# Patient Record
Sex: MALE | Race: BLACK OR AFRICAN AMERICAN | NOT HISPANIC OR LATINO | Employment: OTHER | URBAN - METROPOLITAN AREA
[De-identification: names, ages, dates, MRNs, and addresses within clinical notes are randomized per-mention and may not be internally consistent; named-entity substitution may affect disease eponyms.]

---

## 2023-03-08 LAB
ANION GAP IN SER/PLAS: 16 MMOL/L (ref 10–20)
CALCIUM (MG/DL) IN SER/PLAS: 9.2 MG/DL (ref 8.6–10.6)
CARBON DIOXIDE, TOTAL (MMOL/L) IN SER/PLAS: 23 MMOL/L (ref 21–32)
CHLORIDE (MMOL/L) IN SER/PLAS: 108 MMOL/L (ref 98–107)
CREATININE (MG/DL) IN SER/PLAS: 1.64 MG/DL (ref 0.5–1.3)
GFR MALE: 42 ML/MIN/1.73M2
GLUCOSE (MG/DL) IN SER/PLAS: 102 MG/DL (ref 74–99)
POTASSIUM (MMOL/L) IN SER/PLAS: 4.7 MMOL/L (ref 3.5–5.3)
SODIUM (MMOL/L) IN SER/PLAS: 142 MMOL/L (ref 136–145)
UREA NITROGEN (MG/DL) IN SER/PLAS: 23 MG/DL (ref 6–23)

## 2023-11-09 ENCOUNTER — LAB (OUTPATIENT)
Dept: LAB | Facility: LAB | Age: 78
End: 2023-11-09
Payer: MEDICAID

## 2023-11-09 ENCOUNTER — OFFICE VISIT (OUTPATIENT)
Dept: CARDIOLOGY | Facility: HOSPITAL | Age: 78
End: 2023-11-09
Payer: MEDICAID

## 2023-11-09 DIAGNOSIS — I50.20 HFREF (HEART FAILURE WITH REDUCED EJECTION FRACTION) (MULTI): Primary | ICD-10-CM

## 2023-11-09 DIAGNOSIS — I50.20 UNSPECIFIED SYSTOLIC (CONGESTIVE) HEART FAILURE (MULTI): Primary | ICD-10-CM

## 2023-11-09 DIAGNOSIS — I48.92 ATRIAL FLUTTER, UNSPECIFIED TYPE (MULTI): ICD-10-CM

## 2023-11-09 PROBLEM — Z86.718 HISTORY OF DVT (DEEP VEIN THROMBOSIS): Status: ACTIVE | Noted: 2023-11-09

## 2023-11-09 PROBLEM — H52.202 ASTIGMATISM OF LEFT EYE: Status: ACTIVE | Noted: 2023-11-09

## 2023-11-09 PROBLEM — M19.90 OSTEOARTHROSIS: Status: ACTIVE | Noted: 2023-11-09

## 2023-11-09 PROBLEM — H02.88B MEIBOMIAN GLAND DYSFUNCTION (MGD) OF UPPER AND LOWER EYELID OF LEFT EYE: Status: ACTIVE | Noted: 2023-11-09

## 2023-11-09 PROBLEM — H52.13 MYOPIA WITH PRESBYOPIA OF BOTH EYES: Status: ACTIVE | Noted: 2023-11-09

## 2023-11-09 PROBLEM — M54.9 BACK PAIN, CHRONIC: Status: ACTIVE | Noted: 2023-11-09

## 2023-11-09 PROBLEM — G89.29 BACK PAIN, CHRONIC: Status: ACTIVE | Noted: 2023-11-09

## 2023-11-09 PROBLEM — I25.10 ATHEROSCLEROTIC HEART DISEASE OF NATIVE CORONARY ARTERY WITHOUT ANGINA PECTORIS: Status: ACTIVE | Noted: 2023-11-09

## 2023-11-09 PROBLEM — Z95.810 CARDIAC DEFIBRILLATOR IN SITU: Status: ACTIVE | Noted: 2023-11-09

## 2023-11-09 PROBLEM — H52.4 MYOPIA WITH PRESBYOPIA OF BOTH EYES: Status: ACTIVE | Noted: 2023-11-09

## 2023-11-09 PROBLEM — I77.810 AORTIC ROOT DILATION (CMS-HCC): Status: ACTIVE | Noted: 2023-11-09

## 2023-11-09 PROBLEM — J44.9 COPD, MODERATE (MULTI): Status: ACTIVE | Noted: 2023-11-09

## 2023-11-09 PROBLEM — H02.88A MEIBOMIAN GLAND DYSFUNCTION (MGD) OF UPPER AND LOWER EYELID OF RIGHT EYE: Status: ACTIVE | Noted: 2023-11-09

## 2023-11-09 PROBLEM — E55.9 VITAMIN D DEFICIENCY: Status: ACTIVE | Noted: 2023-11-09

## 2023-11-09 PROBLEM — H52.03 HYPERMETROPIA OF BOTH EYES: Status: ACTIVE | Noted: 2023-11-09

## 2023-11-09 PROBLEM — H25.13 CATARACT, NUCLEAR SCLEROTIC, BOTH EYES: Status: ACTIVE | Noted: 2023-11-09

## 2023-11-09 PROBLEM — F12.10 MARIJUANA ABUSE: Status: ACTIVE | Noted: 2023-11-09

## 2023-11-09 PROBLEM — R91.1 LUNG NODULE: Status: ACTIVE | Noted: 2023-11-09

## 2023-11-09 LAB
ALBUMIN SERPL BCP-MCNC: 4.1 G/DL (ref 3.4–5)
ANION GAP SERPL CALC-SCNC: 14 MMOL/L (ref 10–20)
BUN SERPL-MCNC: 28 MG/DL (ref 6–23)
CALCIUM SERPL-MCNC: 9.3 MG/DL (ref 8.6–10.6)
CHLORIDE SERPL-SCNC: 108 MMOL/L (ref 98–107)
CO2 SERPL-SCNC: 23 MMOL/L (ref 21–32)
CREAT SERPL-MCNC: 1.62 MG/DL (ref 0.5–1.3)
GFR SERPL CREATININE-BSD FRML MDRD: 43 ML/MIN/1.73M*2
GLUCOSE SERPL-MCNC: 92 MG/DL (ref 74–99)
PHOSPHATE SERPL-MCNC: 3.5 MG/DL (ref 2.5–4.9)
POTASSIUM SERPL-SCNC: 4.6 MMOL/L (ref 3.5–5.3)
SODIUM SERPL-SCNC: 140 MMOL/L (ref 136–145)

## 2023-11-09 PROCEDURE — 99215 OFFICE O/P EST HI 40 MIN: CPT | Mod: 25 | Performed by: INTERNAL MEDICINE

## 2023-11-09 PROCEDURE — 36415 COLL VENOUS BLD VENIPUNCTURE: CPT

## 2023-11-09 PROCEDURE — 80069 RENAL FUNCTION PANEL: CPT

## 2023-11-09 PROCEDURE — 1126F AMNT PAIN NOTED NONE PRSNT: CPT | Performed by: INTERNAL MEDICINE

## 2023-11-09 PROCEDURE — 99215 OFFICE O/P EST HI 40 MIN: CPT | Performed by: INTERNAL MEDICINE

## 2023-11-09 PROCEDURE — 1159F MED LIST DOCD IN RCRD: CPT | Performed by: INTERNAL MEDICINE

## 2023-11-09 PROCEDURE — 93005 ELECTROCARDIOGRAM TRACING: CPT | Performed by: INTERNAL MEDICINE

## 2023-11-09 PROCEDURE — 93010 ELECTROCARDIOGRAM REPORT: CPT | Performed by: INTERNAL MEDICINE

## 2023-11-09 PROCEDURE — 1160F RVW MEDS BY RX/DR IN RCRD: CPT | Performed by: INTERNAL MEDICINE

## 2023-11-09 RX ORDER — TIOTROPIUM BROMIDE INHALATION SPRAY 3.12 UG/1
2 SPRAY, METERED RESPIRATORY (INHALATION) DAILY
COMMUNITY
Start: 2020-11-02 | End: 2024-01-24 | Stop reason: SDUPTHER

## 2023-11-09 RX ORDER — NAPROXEN SODIUM 220 MG/1
1 TABLET, FILM COATED ORAL DAILY
COMMUNITY
Start: 2017-08-22 | End: 2024-01-24 | Stop reason: SDUPTHER

## 2023-11-09 RX ORDER — LOSARTAN POTASSIUM 25 MG/1
1 TABLET ORAL DAILY
COMMUNITY
Start: 2022-05-19 | End: 2023-11-09 | Stop reason: DRUGHIGH

## 2023-11-09 RX ORDER — LOSARTAN POTASSIUM 50 MG/1
1 TABLET ORAL DAILY
COMMUNITY
End: 2024-01-24 | Stop reason: SDUPTHER

## 2023-11-09 RX ORDER — CARVEDILOL 6.25 MG/1
1 TABLET ORAL
COMMUNITY
Start: 2022-01-18 | End: 2024-01-24 | Stop reason: SDUPTHER

## 2023-11-09 RX ORDER — ACETAMINOPHEN 500 MG
500 TABLET ORAL
COMMUNITY
Start: 2023-09-26 | End: 2024-05-20 | Stop reason: SDUPTHER

## 2023-11-09 RX ORDER — ATORVASTATIN CALCIUM 40 MG/1
1 TABLET, FILM COATED ORAL NIGHTLY
COMMUNITY
Start: 2018-02-20 | End: 2024-01-24 | Stop reason: SDUPTHER

## 2023-11-09 RX ORDER — ALBUTEROL SULFATE 90 UG/1
2 AEROSOL, METERED RESPIRATORY (INHALATION) 4 TIMES DAILY
COMMUNITY
Start: 2017-12-14 | End: 2024-01-24 | Stop reason: SDUPTHER

## 2023-11-09 RX ORDER — TAMSULOSIN HYDROCHLORIDE 0.4 MG/1
0.4 CAPSULE ORAL
COMMUNITY
Start: 2023-09-18 | End: 2024-01-24 | Stop reason: SDUPTHER

## 2023-11-09 ASSESSMENT — PAIN SCALES - GENERAL: PAINLEVEL: 0-NO PAIN

## 2023-11-09 NOTE — PROGRESS NOTES
"[facility]  Primary Care Physician: No Assigned PCP Generic Provider, MD  Referring Provider: Bishnu      Date of Visit: 11/09/2023  3:00 PM EST  Location of visit: [facility]     HPI:   Mr. Ogden is an 78M with a PMHx sig for stage C systolic HF/NICM/HFrEF with severe LV dysfunction currently without an ICD, HTN, nonobstructive CAD, ascending aortic dilatation, h/o RLE DVT (setting of COVID; 12/2021), BPH, and CKD who presents to the Advanced Heart Failure clinic to establish care.       Currently denies chest pain, palpitations, or LE edema. He reports dyspnea on exertion. Denies orthopnea, PND. No edema noted in BLE. Patient denies headaches, dizziness or recent falls.      Hospitalizations: Denies  Medication adherence:  Diet adherence: Low sodium  Exercise: Physical therapy    SocHx:   Denies smoking, ETOH, illicits, lives alone, has sister and daughter in the area    FamHx:   Mother had CAD      Current Outpatient Medications   Medication Sig Dispense Refill    acetaminophen (Tylenol) 500 mg tablet Take 1 tablet (500 mg) by mouth.      aspirin 81 mg chewable tablet Chew 1 tablet (81 mg) once daily.      atorvastatin (Lipitor) 40 mg tablet Take 1 tablet (40 mg) by mouth once daily at bedtime.      carvedilol (Coreg) 6.25 mg tablet Take 1 tablet (6.25 mg) by mouth 2 times a day with meals.      losartan (Cozaar) 50 mg tablet Take 1 tablet (50 mg) by mouth once daily.      Spiriva Respimat 2.5 mcg/actuation inhaler Inhale 2 puffs once daily.      tamsulosin (Flomax) 0.4 mg 24 hr capsule Take 1 capsule (0.4 mg) by mouth.      Ventolin HFA 90 mcg/actuation inhaler Inhale 2 puffs 4 times a day.       No current facility-administered medications for this visit.       Allergies   Allergen Reactions    Penicillins Anaphylaxis         Visit Vitals  /81 (BP Location: Left arm, Patient Position: Sitting, BP Cuff Size: Adult long)   Pulse 95   Ht 1.778 m (5' 10\") "   Wt 100 kg (221 lb)   SpO2 95%   BMI 31.71 kg/m²   Smoking Status Every Day   BSA 2.22 m²        Physical Exam:  On exam Mr. Ogden appears his stated age, is alert and oriented x3, and in no acute distress. His sclera are anicteric and his oropharynx has moist mucous membranes. His neck is supple and without thyromegaly. The JVP is ~6 cm of water above the right atrium. His cardiac exam has regular rhythm, normal S1, S2. No S3/4. There are no murmurs. His lungs are clear to auscultation bilaterally and there is no dullness to percussion. His abdomen is soft, nontender with normoactive bowel sounds. There is no HJR. The extremities are warm and without edema. The skin is dry. There is no rash present. The distal pulses are 2+ in all four extremities. His mood and affect are appropriate for todays encounter.       Cardiac Labs/Diagnostics:    Lab Results   Component Value Date    CREATININE 1.64 (H) 03/08/2023    BUN 23 03/08/2023     03/08/2023    K 4.7 03/08/2023     (H) 03/08/2023    CO2 23 03/08/2023        Recent Labs     12/20/21  0246 12/17/21  0646 12/16/21  1824 08/03/21  1416 10/05/20  2105   * 61 68 154* 3,040*       ECG (11/9/23):  A-sense, V-paced ()    CT chest (3/8/23):  1.  There is stable mild thoracic aneurysmal dilatation of the aortic  root and ascending aorta. Recommend surveillance with 2 year  echocardiogram or contrasted chest CT.  2. Extensive parenchymal changes suggest prior COVID 19 infection  3. Fluid-filled esophagus and correlate with esophageal  dysmotility/reflux.    Echo (3/8/23):  1. Left ventricular systolic function is severely decreased with a 25-30% estimated ejection fraction.  2. Spectral Doppler shows an impaired relaxation pattern of left ventricular diastolic filling.  3. The left atrium is mild to moderately dilated.  4. There is global hypokinesis of the left ventricle with minor regional variations.  5. There is moderate dilatation of the  aortic root.  6. There is moderate dilatation of the ascending aorta.    Cardiac cath (10/14/20):  1. Right dominant system.  2. Mid RCA stenosis of 40-50%.   3. Non-obstructive coronary artery disease.  4. Normal filling pressure with RA pressure of 4 mmHG and PCWP of 13 mmHG.          Impression/Plan:  Mr. Ogden is an 78M with a PMHx sig for stage C systolic HF/NICM/HFrEF with severe LV dysfunction currently without an ICD, HTN, nonobstructive CAD, ascending aortic dilatation, h/o RLE DVT (setting of COVID; 12/2021), BPH, and CKD who presents to the Advanced Heart Failure clinic to establish care. At the current time he has functional class II symptoms and appears euvolemic on exam.     1) Stage C chronic systolic HF/NICM/HFrEF with severe LV dysfunction (LVEF 25-30%; 3/2023) currently without an ICD  Discussed benefits of optimizing GDMT; he is currently not interested in adjusting his GDMT.   -c/w carvedilol 6.25 mg bid, losartan 50 mg daily  -check BNP; pending results will revisit OMT for HFrEF    2) HTN  -as per #1    3) Nonobstructive CAD  Denies angina. Most recent LDL 59 (8/2021).   -c/w ASA 81 mg daily, atorvastatin 40 mg daily, beta blocker    4) Ascending aortic dilatation  Seen by Dr. Goetz; no intervention at the current time. Need to ensure optimal BP control, as also per #1.  -will monitor with echo      F/U: 3 months at /[facility]      ____________________________________________________________  Marek Story DO  Section of Advanced Heart Failure and Cardiac Transplantation  Division of Cardiovascular Medicine  [facility]  [facility]

## 2023-11-09 NOTE — PATIENT INSTRUCTIONS
It was a pleasure seeing you today. Please contact myself or my team with any questions.     To reach Dr. Story' office please call [phone] ([place]).   Fax: [phone]   To schedule an appointment call [phone]     If you have any questions or need cardiac medication refills, please call the Heart Failure office at [phone], option 6. You may also contact the  Heart Failure Nursing team via email at [email or web address] (Please include your name and date of birth).         1) Continue your current medications  2) Labs (RFP/BNP)  3) Follow up in 3 months at /[facility]

## 2023-11-10 VITALS
BODY MASS INDEX: 31.64 KG/M2 | SYSTOLIC BLOOD PRESSURE: 148 MMHG | DIASTOLIC BLOOD PRESSURE: 78 MMHG | HEIGHT: 70 IN | WEIGHT: 221 LBS | HEART RATE: 95 BPM | OXYGEN SATURATION: 95 %

## 2023-11-20 LAB
ATRIAL RATE: 105 BPM
P AXIS: 64 DEGREES
P OFFSET: 187 MS
P ONSET: 125 MS
PR INTERVAL: 142 MS
Q ONSET: 196 MS
QRS COUNT: 17 BEATS
QRS DURATION: 142 MS
QT INTERVAL: 400 MS
QTC CALCULATION(BAZETT): 528 MS
QTC FREDERICIA: 482 MS
R AXIS: -86 DEGREES
T AXIS: 85 DEGREES
T OFFSET: 396 MS
VENTRICULAR RATE: 105 BPM

## 2024-01-18 ENCOUNTER — TELEPHONE (OUTPATIENT)
Dept: PRIMARY CARE | Facility: HOSPITAL | Age: 79
End: 2024-01-18
Payer: MEDICAID

## 2024-01-18 NOTE — TELEPHONE ENCOUNTER
Patient called in stating that, he was told you need to call [facility] at [phone] and ask for jovita and he needs it ASAP, please and thank you!

## 2024-01-24 ENCOUNTER — OFFICE VISIT (OUTPATIENT)
Dept: PRIMARY CARE | Facility: HOSPITAL | Age: 79
End: 2024-01-24
Payer: MEDICAID

## 2024-01-24 VITALS
BODY MASS INDEX: 31.64 KG/M2 | WEIGHT: 226 LBS | HEART RATE: 92 BPM | HEIGHT: 71 IN | OXYGEN SATURATION: 92 % | SYSTOLIC BLOOD PRESSURE: 129 MMHG | DIASTOLIC BLOOD PRESSURE: 94 MMHG | TEMPERATURE: 99 F

## 2024-01-24 DIAGNOSIS — I25.10 CORONARY ARTERY DISEASE INVOLVING NATIVE CORONARY ARTERY OF NATIVE HEART WITHOUT ANGINA PECTORIS: ICD-10-CM

## 2024-01-24 DIAGNOSIS — J96.01 ACUTE RESPIRATORY FAILURE WITH HYPOXIA (MULTI): Primary | ICD-10-CM

## 2024-01-24 DIAGNOSIS — I50.23 ACUTE ON CHRONIC SYSTOLIC HEART FAILURE (MULTI): ICD-10-CM

## 2024-01-24 DIAGNOSIS — N40.0 BENIGN PROSTATIC HYPERPLASIA, UNSPECIFIED WHETHER LOWER URINARY TRACT SYMPTOMS PRESENT: ICD-10-CM

## 2024-01-24 LAB
ALBUMIN SERPL BCP-MCNC: 3.9 G/DL (ref 3.4–5)
ANION GAP SERPL CALC-SCNC: 17 MMOL/L (ref 10–20)
BUN SERPL-MCNC: 27 MG/DL (ref 6–23)
CALCIUM SERPL-MCNC: 9.4 MG/DL (ref 8.6–10.6)
CHLORIDE SERPL-SCNC: 107 MMOL/L (ref 98–107)
CO2 SERPL-SCNC: 24 MMOL/L (ref 21–32)
CREAT SERPL-MCNC: 1.64 MG/DL (ref 0.5–1.3)
EGFRCR SERPLBLD CKD-EPI 2021: 42 ML/MIN/1.73M*2
ERYTHROCYTE [DISTWIDTH] IN BLOOD BY AUTOMATED COUNT: 14.3 % (ref 11.5–14.5)
GLUCOSE SERPL-MCNC: 88 MG/DL (ref 74–99)
HCT VFR BLD AUTO: 38.4 % (ref 41–52)
HGB BLD-MCNC: 11.6 G/DL (ref 13.5–17.5)
MAGNESIUM SERPL-MCNC: 1.88 MG/DL (ref 1.6–2.4)
MCH RBC QN AUTO: 29.4 PG (ref 26–34)
MCHC RBC AUTO-ENTMCNC: 30.2 G/DL (ref 32–36)
MCV RBC AUTO: 98 FL (ref 80–100)
NRBC BLD-RTO: 0 /100 WBCS (ref 0–0)
PHOSPHATE SERPL-MCNC: 4.6 MG/DL (ref 2.5–4.9)
PLATELET # BLD AUTO: 149 X10*3/UL (ref 150–450)
POTASSIUM SERPL-SCNC: 4.7 MMOL/L (ref 3.5–5.3)
RBC # BLD AUTO: 3.94 X10*6/UL (ref 4.5–5.9)
SODIUM SERPL-SCNC: 143 MMOL/L (ref 136–145)
WBC # BLD AUTO: 5.6 X10*3/UL (ref 4.4–11.3)

## 2024-01-24 PROCEDURE — 99215 OFFICE O/P EST HI 40 MIN: CPT | Performed by: STUDENT IN AN ORGANIZED HEALTH CARE EDUCATION/TRAINING PROGRAM

## 2024-01-24 PROCEDURE — 83880 ASSAY OF NATRIURETIC PEPTIDE: CPT | Performed by: STUDENT IN AN ORGANIZED HEALTH CARE EDUCATION/TRAINING PROGRAM

## 2024-01-24 PROCEDURE — 1126F AMNT PAIN NOTED NONE PRSNT: CPT | Performed by: STUDENT IN AN ORGANIZED HEALTH CARE EDUCATION/TRAINING PROGRAM

## 2024-01-24 PROCEDURE — 84145 PROCALCITONIN (PCT): CPT | Performed by: STUDENT IN AN ORGANIZED HEALTH CARE EDUCATION/TRAINING PROGRAM

## 2024-01-24 PROCEDURE — 36415 COLL VENOUS BLD VENIPUNCTURE: CPT | Performed by: STUDENT IN AN ORGANIZED HEALTH CARE EDUCATION/TRAINING PROGRAM

## 2024-01-24 PROCEDURE — 85027 COMPLETE CBC AUTOMATED: CPT | Performed by: STUDENT IN AN ORGANIZED HEALTH CARE EDUCATION/TRAINING PROGRAM

## 2024-01-24 PROCEDURE — 83735 ASSAY OF MAGNESIUM: CPT | Performed by: STUDENT IN AN ORGANIZED HEALTH CARE EDUCATION/TRAINING PROGRAM

## 2024-01-24 PROCEDURE — 80069 RENAL FUNCTION PANEL: CPT | Performed by: STUDENT IN AN ORGANIZED HEALTH CARE EDUCATION/TRAINING PROGRAM

## 2024-01-24 PROCEDURE — 99215 OFFICE O/P EST HI 40 MIN: CPT | Mod: GC | Performed by: STUDENT IN AN ORGANIZED HEALTH CARE EDUCATION/TRAINING PROGRAM

## 2024-01-24 PROCEDURE — 1160F RVW MEDS BY RX/DR IN RCRD: CPT | Performed by: STUDENT IN AN ORGANIZED HEALTH CARE EDUCATION/TRAINING PROGRAM

## 2024-01-24 RX ORDER — TAMSULOSIN HYDROCHLORIDE 0.4 MG/1
0.4 CAPSULE ORAL DAILY
Qty: 30 CAPSULE | Refills: 11 | Status: SHIPPED | OUTPATIENT
Start: 2024-01-24 | End: 2024-05-20 | Stop reason: SDUPTHER

## 2024-01-24 RX ORDER — TIOTROPIUM BROMIDE INHALATION SPRAY 3.12 UG/1
2 SPRAY, METERED RESPIRATORY (INHALATION) DAILY
Qty: 1 EACH | Refills: 3 | Status: SHIPPED | OUTPATIENT
Start: 2024-01-24 | End: 2024-05-20 | Stop reason: SDUPTHER

## 2024-01-24 RX ORDER — ALBUTEROL SULFATE 90 UG/1
2 AEROSOL, METERED RESPIRATORY (INHALATION) EVERY 4 HOURS PRN
Qty: 18 G | Refills: 2 | Status: SHIPPED | OUTPATIENT
Start: 2024-01-24 | End: 2024-05-20 | Stop reason: SDUPTHER

## 2024-01-24 RX ORDER — ATORVASTATIN CALCIUM 40 MG/1
40 TABLET, FILM COATED ORAL NIGHTLY
Qty: 30 TABLET | Refills: 3 | Status: SHIPPED | OUTPATIENT
Start: 2024-01-24 | End: 2024-05-20 | Stop reason: SDUPTHER

## 2024-01-24 RX ORDER — TORSEMIDE 20 MG/1
40 TABLET ORAL DAILY
Qty: 30 TABLET | Refills: 3 | Status: SHIPPED | OUTPATIENT
Start: 2024-01-24 | End: 2024-01-24 | Stop reason: SDUPTHER

## 2024-01-24 RX ORDER — LOSARTAN POTASSIUM 50 MG/1
50 TABLET ORAL DAILY
Qty: 30 TABLET | Refills: 11 | Status: SHIPPED | OUTPATIENT
Start: 2024-01-24 | End: 2024-05-20 | Stop reason: SDUPTHER

## 2024-01-24 RX ORDER — TORSEMIDE 20 MG/1
40 TABLET ORAL DAILY
Qty: 30 TABLET | Refills: 3 | Status: SHIPPED | OUTPATIENT
Start: 2024-01-24 | End: 2024-04-15

## 2024-01-24 RX ORDER — NAPROXEN SODIUM 220 MG/1
81 TABLET, FILM COATED ORAL DAILY
Qty: 30 TABLET | Refills: 3 | Status: SHIPPED | OUTPATIENT
Start: 2024-01-24 | End: 2024-05-20 | Stop reason: SDUPTHER

## 2024-01-24 RX ORDER — CARVEDILOL 6.25 MG/1
6.25 TABLET ORAL
Qty: 60 TABLET | Refills: 11 | Status: SHIPPED | OUTPATIENT
Start: 2024-01-24 | End: 2024-05-20 | Stop reason: SDUPTHER

## 2024-01-24 ASSESSMENT — ENCOUNTER SYMPTOMS
LOSS OF SENSATION IN FEET: 0
DEPRESSION: 0
OCCASIONAL FEELINGS OF UNSTEADINESS: 1

## 2024-01-24 ASSESSMENT — PATIENT HEALTH QUESTIONNAIRE - PHQ9
2. FEELING DOWN, DEPRESSED OR HOPELESS: NOT AT ALL
SUM OF ALL RESPONSES TO PHQ9 QUESTIONS 1 & 2: 0
1. LITTLE INTEREST OR PLEASURE IN DOING THINGS: NOT AT ALL

## 2024-01-24 ASSESSMENT — PAIN SCALES - GENERAL: PAINLEVEL: 0-NO PAIN

## 2024-01-24 NOTE — PROGRESS NOTES
CC: F2F for HHA and Home oxygen      HPI: Mr. Ogden is a 78 year old M with PMHx of Stage C systolic HF/NICM/HFrEF (TTE 3/23 EF 25-30%), 2:1 AV block s/p ICD, HTN, nonobstructive CAD, ascending aortic dilatation (4.1 cm on CT 03/2023), h/o RLE DVT (setting of COVID; 12/2021), BPH, and CKD (baseline Scr ~1.5), Aflutter s/p CTI RFA (2018), previous respiratory failure requiring oxygen supplementation (setting of COVID PNA; 12/2022)     Presents to today to renew HHA/home oxygen. During interview patient visibly tachypneic particularly when speaking. Reports that over the past 2.5 months he has been experiencing new SOB. Reports that it initially came on due to use of bug spray and that is what continues to cause despite not reusing the spray. Characterizes as worsening with exertion and relieved by rest, not associated with chest pain, recent fever, or cough. Was previously on oxygen when he had COVID PNA in 2022 however weaned off (until 2.5 months ago). Unclear when he stopped using oxygen. Endorses lower extremity swelling, weight gain, and difficulty laying flat. Compliant with all medications. Has no dietary restrictions. No recent illness. Pt did not bring home oxygen tank to visit today.      Vitals:    01/24/24 1318   BP: (!) 129/94   Pulse: 92   Temp: 37.2 °C (99 °F)   SpO2: 92%   Weight: 226 lb (3 months prior 221, reports baseline weight as 214 lb).     On ambulation, desaturated to 86% and was increasingly SOB. Saturation improved to 94% with 2L NC.     Current Outpatient Medications on File Prior to Visit   Medication Sig Dispense Refill    acetaminophen (Tylenol) 500 mg tablet Take 1 tablet (500 mg) by mouth.      [DISCONTINUED] aspirin 81 mg chewable tablet Chew 1 tablet (81 mg) once daily.      [DISCONTINUED] atorvastatin (Lipitor) 40 mg tablet Take 1 tablet (40 mg) by mouth once daily at bedtime.      [DISCONTINUED] carvedilol (Coreg) 6.25 mg tablet Take 1 tablet (6.25 mg) by mouth 2 times a day  with meals.      [DISCONTINUED] losartan (Cozaar) 50 mg tablet Take 1 tablet (50 mg) by mouth once daily.      [DISCONTINUED] Spiriva Respimat 2.5 mcg/actuation inhaler Inhale 2 puffs once daily.      [DISCONTINUED] tamsulosin (Flomax) 0.4 mg 24 hr capsule Take 1 capsule (0.4 mg) by mouth.      [DISCONTINUED] Ventolin HFA 90 mcg/actuation inhaler Inhale 2 puffs 4 times a day.       No current facility-administered medications on file prior to visit.         Physical Exam   Constitutional - tachypnea   CV- +S1/S2, no mrg   PULM - crackles at bilateral bases otherwise clear  GI- soft, non tender,   Neck - no JVD, carotid bruits   Ext -“ 2+ bilateral extremity swelling to the shins     Assessment/Plan:     Mr. Ogden is a 78 year old M with PMHx of Stage C systolic HF/NICM/HFrEF (TTE 3/23 EF 25-30%), 2:1 AV block s/p ICD, Aflutter s/p CTI RFA (2018)HTN, nonobstructive CAD, ascending aortic dilatation (4.1 cm on CT 03/2023), h/o RLE DVT (setting of COVID; 12/2021), BPH, and CKD (baseline Scr ~1.5), previous respiratory failure requiring oxygen supplementation (setting of COVID PNA; 12/2022) presenting to [facility] for  F2F for HHA and Home oxygen and found to have AHRF.     **Explained to patient that given low oxygen levels with ambulation AND lack of ambulatory home oxygen, my medical recommendation would be to either cordero ambulatory oxygen or go to the emergency department for further evaluation. Patient expressed that he was not interested and prefer to go home and use his own home oxygen    #AHRF  #Stage C systolic HF/NICM/HFrEF (TTE 3/23 EF 25-30%)   #2:1 AV block s/p ICD  #Aflutter s/p CTI RFA (2018)  #HTN  :: Would appear that patient recovered from previous episode of resp failure (setting of COVID) and over the past few months has developed worsening SOB a/w orthopnea weight gain, and lower extremity swelling. Suspect ADHF possibly due to diet.   -Start torsemide 40 mg every day (higher dose to account for  CrCl)  -Instructed on dietary discretion in terms of salt and daily weight check   -Labs and CXR today   -Plan for virtual appointment 1/29   -Refilled home oxygen   -C/w current GDMT regimen - Losartan 50, Coreg 6.125   -Followed by HF (Dr Story), next appt 02/2024    #CAD, nonobstructive   :: Cath 10/2020 w/ mild RCA stenosis 40-50%   -c/w ASA 81 mg daily, atorvastatin 40 mg daily, beta blocker     #Ascending aortic dilatation (4.1 cm on CT 03/2023)  :: Stable on most recent CT. Seen by CTS and recommended no addition follow up  -Repeat screening 03/2025    #BPH  -C/w tamsulosin 0.4     #CKD (baseline Scr ~1.5)  :: Likely in the setting of HTN. HA1C 5.7% 2021.   -Repeat labs today     #COPD  -Chart diagnosis of COPD, however PFTs from 10/2020 w/ no airway obstruction  [ ] C/w albuterol prn, Spiriva 2 puffs daily    #Health Maintenance  Screeening  -cscope â€“ last in 2015 , negative findings. -LDCT/AAA â€“ not discussed in this visit.      Health Maintenance   cscope  last in 2015 , negative findings.   LDCT/AAA not discussed in this visit.   Shingrix not discussed at this visit   Covid  vaccinated x 2   Flu not discussed this visit

## 2024-01-24 NOTE — PATIENT INSTRUCTIONS
Mr Ogden,     Today we discussed your worsening shortness of breath. I am concerned that this is related to your known condition of heart failure. Below is the plan we discussed   Add new medication called torsemide . This will cause you to urinate more frequently. Please take in the AM  Please complete chest xray on the fifth floor  Refrain from food items that are salty and weigh yourself on a daily basis   We will have a virtual or phone appointment on Monday to see how are you feeling. Please repeat blood work Monday morning prior to our appointment.    I refilled the remainder off your medications as well as your home oxygen and HHA.

## 2024-01-25 LAB
BNP SERPL-MCNC: 1521 PG/ML (ref 0–99)
PROCALCITONIN SERPL-MCNC: 0.05 NG/ML

## 2024-01-26 NOTE — PROGRESS NOTES
I saw and evaluated the patient. I personally obtained the key and critical portions of the history and physical exam or was physically present for key and critical portions performed by the resident/fellow. I reviewed the resident/fellow's documentation and discussed the patient with the resident/fellow. I agree with the resident/fellow's medical decision making as documented in the note.    Patient seen in clinic, came in for follow-up.  Was noted to be short of breath, hypoxic 86 to 88% with ambulation, no distress but was noted to be mildly tachypneic.  On rest he was comfortable, was placed on 2 L nasal cannula oxygen in the clinic.  On exam patient was noted to have some crackles, lower extremity edema present.  Does have history of congestive heart failure, stage C last EF of 25 to 30%.  He was advised to go to the ED given his shortness of breath and desaturation, he does have home oxygen but came in with no O2 to the clinic.  He adamantly refused to go to the ER, reported that he could go back home shortly and use his oxygen.  Plan to get labs RFP, BNP and chest x-ray.  Given concern for decompensated heart failure we will start him on torsemide pending labs.  Will need to follow-up with cardiology.      Ioana Winter MD MPH

## 2024-01-30 DIAGNOSIS — I50.20 HFREF (HEART FAILURE WITH REDUCED EJECTION FRACTION) (MULTI): Primary | ICD-10-CM

## 2024-01-30 NOTE — PROGRESS NOTES
Spoke to patient this AM (1/30) regarding shortness of breath following recent appt on 1/24. Reports improvement since starting torsemide. Particularly less SOB with movement and now only using oxygen while he sleeps. Also notes less leg swelling with diminished indentation along his socks. Continues to feel SOB when laying in his bed. Confirmed his appt with cardiology on 2/15. Instructed him to complete blood work prior to this (RFP, Mg, BNP). RTC 6 months.

## 2024-02-15 ENCOUNTER — OFFICE VISIT (OUTPATIENT)
Dept: CARDIOLOGY | Facility: HOSPITAL | Age: 79
End: 2024-02-15
Payer: MEDICAID

## 2024-02-15 ENCOUNTER — LAB (OUTPATIENT)
Dept: LAB | Facility: LAB | Age: 79
End: 2024-02-15
Payer: MEDICAID

## 2024-02-15 VITALS
OXYGEN SATURATION: 96 % | BODY MASS INDEX: 30.13 KG/M2 | HEART RATE: 96 BPM | WEIGHT: 216 LBS | DIASTOLIC BLOOD PRESSURE: 81 MMHG | SYSTOLIC BLOOD PRESSURE: 123 MMHG

## 2024-02-15 DIAGNOSIS — I42.8 NONISCHEMIC CARDIOMYOPATHY (MULTI): ICD-10-CM

## 2024-02-15 DIAGNOSIS — I50.20 ACC/AHA STAGE C SYSTOLIC HEART FAILURE (MULTI): Primary | ICD-10-CM

## 2024-02-15 DIAGNOSIS — I50.20 HFREF (HEART FAILURE WITH REDUCED EJECTION FRACTION) (MULTI): ICD-10-CM

## 2024-02-15 DIAGNOSIS — I10 ESSENTIAL HYPERTENSION: ICD-10-CM

## 2024-02-15 LAB — BNP SERPL-MCNC: 199 PG/ML (ref 0–99)

## 2024-02-15 PROCEDURE — 1160F RVW MEDS BY RX/DR IN RCRD: CPT | Performed by: INTERNAL MEDICINE

## 2024-02-15 PROCEDURE — 3079F DIAST BP 80-89 MM HG: CPT | Performed by: INTERNAL MEDICINE

## 2024-02-15 PROCEDURE — 83880 ASSAY OF NATRIURETIC PEPTIDE: CPT

## 2024-02-15 PROCEDURE — 1126F AMNT PAIN NOTED NONE PRSNT: CPT | Performed by: INTERNAL MEDICINE

## 2024-02-15 PROCEDURE — 3074F SYST BP LT 130 MM HG: CPT | Performed by: INTERNAL MEDICINE

## 2024-02-15 PROCEDURE — 36415 COLL VENOUS BLD VENIPUNCTURE: CPT

## 2024-02-15 PROCEDURE — 1159F MED LIST DOCD IN RCRD: CPT | Performed by: INTERNAL MEDICINE

## 2024-02-15 PROCEDURE — 99214 OFFICE O/P EST MOD 30 MIN: CPT | Performed by: INTERNAL MEDICINE

## 2024-02-15 ASSESSMENT — PAIN SCALES - GENERAL: PAINLEVEL: 0-NO PAIN

## 2024-02-15 NOTE — PROGRESS NOTES
[facility] Advanced [facility]  Primary Care Physician: Marvel Pinon MD  Referring Provider: Bishnu      Date of Visit: 02/15/2024  2:40 PM EST  Location of visit: [facility]     HPI:   Mr. Ogden is an 79M with a PMHx sig for stage C systolic HF/NICM/HFrEF with severe LV dysfunction currently without an ICD, HTN, nonobstructive CAD, ascending aortic dilatation, h/o RLE DVT (setting of COVID; 12/2021), BPH, and CKD who returns to the Advanced Heart Failure clinic for ongoing evaluation and management.        Interval hx:   He was started on torsemide after he was seen at his PCP appointment. When he presents today he reports feeling much better since starting diuretics.     He says the rattle in his chest is gone and that he is breathing better and with no LE edema.     Currently denies chest pain, palpitations, orthopnea, negative PND. No edema noted in BLE. Patient denies headaches, dizziness or recent falls.       Hospitalizations: Denies  Medication adherence: Reports adherence       SocHx:   Denies smoking, ETOH, illicits, lives alone, has sister and daughter in the area    FamHx:   Mother had CAD      Current Outpatient Medications   Medication Sig Dispense Refill    acetaminophen (Tylenol) 500 mg tablet Take 1 tablet (500 mg) by mouth.      aspirin 81 mg chewable tablet Chew 1 tablet (81 mg) once daily. 30 tablet 3    atorvastatin (Lipitor) 40 mg tablet Take 1 tablet (40 mg) by mouth once daily at bedtime. 30 tablet 3    carvedilol (Coreg) 6.25 mg tablet Take 1 tablet (6.25 mg) by mouth 2 times a day with meals. 60 tablet 11    losartan (Cozaar) 50 mg tablet Take 1 tablet (50 mg) by mouth once daily. 30 tablet 11    Spiriva Respimat 2.5 mcg/actuation inhaler Inhale 2 puffs once daily. 1 each 3    tamsulosin (Flomax) 0.4 mg 24 hr capsule Take 1 capsule (0.4 mg) by mouth once daily. 30 capsule 11    torsemide (Demadex) 20 mg tablet Take 2 tablets (40 mg) by mouth once  daily. 30 tablet 3    Ventolin HFA 90 mcg/actuation inhaler Inhale 2 puffs every 4 hours if needed for wheezing. 18 g 2     No current facility-administered medications for this visit.       Allergies   Allergen Reactions    Penicillins Anaphylaxis        Physical Exam:  On exam Mr. Ogden appears his stated age, is alert and oriented x3, and in no acute distress. His sclera are anicteric and his oropharynx has moist mucous membranes. His neck is supple and without thyromegaly. The JVP is ~5 cm of water above the right atrium. His cardiac exam has regular rhythm, normal S1, S2. No S3/4. There are no murmurs. His lungs are clear to auscultation bilaterally and there is no dullness to percussion. His abdomen is soft, nontender with normoactive bowel sounds. There is no HJR. The extremities are warm and without edema. The skin is dry. There is no rash present. The distal pulses are 2+ in all four extremities. His mood and affect are appropriate for todays encounter.       Cardiac Labs/Diagnostics:    Lab Results   Component Value Date    CREATININE 1.64 (H) 01/24/2024    BUN 27 (H) 01/24/2024     01/24/2024    K 4.7 01/24/2024     01/24/2024    CO2 24 01/24/2024        Recent Labs     01/24/24  1438 12/20/21  0246 12/17/21  0646 12/16/21  1824 08/03/21  1416   BNP 1,521* 111* 61 68 154*       ECG (11/9/23):  A-sense, V-paced ()    CT chest (3/8/23):  1.  There is stable mild thoracic aneurysmal dilatation of the aortic  root and ascending aorta. Recommend surveillance with 2 year  echocardiogram or contrasted chest CT.  2. Extensive parenchymal changes suggest prior COVID 19 infection  3. Fluid-filled esophagus and correlate with esophageal  dysmotility/reflux.    Echo (3/8/23):  1. Left ventricular systolic function is severely decreased with a 25-30% estimated ejection fraction.  2. Spectral Doppler shows an impaired relaxation pattern of left ventricular diastolic filling.  3. The left atrium is  mild to moderately dilated.  4. There is global hypokinesis of the left ventricle with minor regional variations.  5. There is moderate dilatation of the aortic root.  6. There is moderate dilatation of the ascending aorta.    Cardiac cath (10/14/20):  1. Right dominant system.  2. Mid RCA stenosis of 40-50%.   3. Non-obstructive coronary artery disease.  4. Normal filling pressure with RA pressure of 4 mmHG and PCWP of 13 mmHG.    Impression/Plan:  Mr. Ogden is an 79M with a PMHx sig for stage C systolic HF/NICM/HFrEF with severe LV dysfunction currently without an ICD, HTN, nonobstructive CAD, ascending aortic dilatation, h/o RLE DVT (setting of COVID; 12/2021), BPH, and CKD who returns to the Advanced Heart Failure clinic for ongoing evaluation and management. At the current time he has functional class II symptoms and appears euvolemic on exam.     1) Stage C chronic systolic HF/NICM/HFrEF with severe LV dysfunction (LVEF 25-30%; 3/2023) currently without an ICD  He is feeling better after agreeing to take diuretics. He is scheduled for repeat labs after todays appointment.   -c/w carvedilol 6.25 mg bid, losartan 50 mg daily, torsemide 40 mg daily  -f/u labs; will revisit additional GDMT once labs result     2) HTN  -as per #1    3) Nonobstructive CAD  Denies angina. Most recent LDL 59 (8/2021).   -c/w ASA 81 mg daily, atorvastatin 40 mg daily, beta blocker    4) Ascending aortic dilatation  Seen by Dr. Goetz; no intervention at the current time. Need to ensure optimal BP control, as also per #1.  -will monitor with echo      F/U: 6 months at /[place] [address]      ____________________________________________________________  Marek Story DO  Section of Advanced Heart Failure and Cardiac Transplantation  Division of Cardiovascular Medicine  [facility]  [facility]

## 2024-02-15 NOTE — PATIENT INSTRUCTIONS
It was a pleasure seeing you today. Please contact myself or my team with any questions.     To reach Dr. Story' office please call [phone] ([place]).   Fax: [phone]   To schedule an appointment call [phone]     If you have any questions or need cardiac medication refills, please call the Heart Failure office at [phone], option [phone]. You may also contact the  Heart Failure Nursing team via email at [email or web address] (Please include your name and date of birth).          1) Continue your current medications  2) Follow up in 6 months at /[facility]

## 2024-02-16 ENCOUNTER — TELEPHONE (OUTPATIENT)
Dept: PRIMARY CARE | Facility: HOSPITAL | Age: 79
End: 2024-02-16
Payer: MEDICAID

## 2024-02-16 DIAGNOSIS — I50.20 HFREF (HEART FAILURE WITH REDUCED EJECTION FRACTION) (MULTI): Primary | ICD-10-CM

## 2024-02-16 NOTE — TELEPHONE ENCOUNTER
See following message: Dr. Chan-I got a call from lab that tests were canceled for this patient. Later realized they were seen at [facility] & Dr. Winter was probably attending, they called her office. Should I let a nurse there know or can you take it from here? Mag & renal drawn yesterday were canceled QNS. Questions call main lab [phone]. Thanks.

## 2024-04-15 DIAGNOSIS — I25.10 CORONARY ARTERY DISEASE INVOLVING NATIVE CORONARY ARTERY OF NATIVE HEART WITHOUT ANGINA PECTORIS: ICD-10-CM

## 2024-04-16 DIAGNOSIS — I50.23 ACUTE ON CHRONIC SYSTOLIC HEART FAILURE (MULTI): ICD-10-CM

## 2024-04-16 RX ORDER — TORSEMIDE 20 MG/1
40 TABLET ORAL DAILY
Qty: 180 TABLET | Refills: 3 | Status: SHIPPED | OUTPATIENT
Start: 2024-04-16 | End: 2024-05-02 | Stop reason: SDUPTHER

## 2024-05-02 DIAGNOSIS — I50.23 ACUTE ON CHRONIC SYSTOLIC HEART FAILURE (MULTI): ICD-10-CM

## 2024-05-02 RX ORDER — TORSEMIDE 20 MG/1
40 TABLET ORAL DAILY
Qty: 180 TABLET | Refills: 1 | Status: SHIPPED | OUTPATIENT
Start: 2024-05-02 | End: 2024-05-20 | Stop reason: SDUPTHER

## 2024-05-20 ENCOUNTER — OFFICE VISIT (OUTPATIENT)
Dept: PRIMARY CARE | Facility: HOSPITAL | Age: 79
End: 2024-05-20
Payer: MEDICAID

## 2024-05-20 VITALS
DIASTOLIC BLOOD PRESSURE: 68 MMHG | WEIGHT: 222 LBS | HEART RATE: 95 BPM | BODY MASS INDEX: 31.08 KG/M2 | OXYGEN SATURATION: 95 % | SYSTOLIC BLOOD PRESSURE: 94 MMHG | TEMPERATURE: 97.3 F | HEIGHT: 71 IN

## 2024-05-20 DIAGNOSIS — M25.562 CHRONIC PAIN OF BOTH KNEES: Primary | ICD-10-CM

## 2024-05-20 DIAGNOSIS — J96.01 ACUTE RESPIRATORY FAILURE WITH HYPOXIA (MULTI): ICD-10-CM

## 2024-05-20 DIAGNOSIS — M25.561 CHRONIC PAIN OF BOTH KNEES: Primary | ICD-10-CM

## 2024-05-20 DIAGNOSIS — G89.29 CHRONIC PAIN OF BOTH KNEES: Primary | ICD-10-CM

## 2024-05-20 DIAGNOSIS — I50.23 ACUTE ON CHRONIC SYSTOLIC HEART FAILURE (MULTI): ICD-10-CM

## 2024-05-20 DIAGNOSIS — I25.10 CORONARY ARTERY DISEASE INVOLVING NATIVE CORONARY ARTERY OF NATIVE HEART WITHOUT ANGINA PECTORIS: ICD-10-CM

## 2024-05-20 DIAGNOSIS — N40.0 BENIGN PROSTATIC HYPERPLASIA, UNSPECIFIED WHETHER LOWER URINARY TRACT SYMPTOMS PRESENT: ICD-10-CM

## 2024-05-20 PROCEDURE — 99214 OFFICE O/P EST MOD 30 MIN: CPT

## 2024-05-20 PROCEDURE — 1160F RVW MEDS BY RX/DR IN RCRD: CPT

## 2024-05-20 PROCEDURE — 99214 OFFICE O/P EST MOD 30 MIN: CPT | Mod: GC

## 2024-05-20 PROCEDURE — 1125F AMNT PAIN NOTED PAIN PRSNT: CPT

## 2024-05-20 PROCEDURE — 1159F MED LIST DOCD IN RCRD: CPT

## 2024-05-20 RX ORDER — LOSARTAN POTASSIUM 50 MG/1
25 TABLET ORAL DAILY
Qty: 15 TABLET | Refills: 11 | Status: SHIPPED | OUTPATIENT
Start: 2024-05-20 | End: 2024-05-20 | Stop reason: SDUPTHER

## 2024-05-20 RX ORDER — LOSARTAN POTASSIUM 50 MG/1
25 TABLET ORAL DAILY
Qty: 15 TABLET | Refills: 11 | Status: SHIPPED | OUTPATIENT
Start: 2024-05-20 | End: 2025-05-20

## 2024-05-20 RX ORDER — NAPROXEN SODIUM 220 MG/1
81 TABLET, FILM COATED ORAL DAILY
Qty: 30 TABLET | Refills: 3 | Status: SHIPPED | OUTPATIENT
Start: 2024-05-20 | End: 2025-05-20

## 2024-05-20 RX ORDER — CARVEDILOL 6.25 MG/1
6.25 TABLET ORAL
Qty: 60 TABLET | Refills: 11 | Status: SHIPPED | OUTPATIENT
Start: 2024-05-20 | End: 2025-05-20

## 2024-05-20 RX ORDER — TAMSULOSIN HYDROCHLORIDE 0.4 MG/1
0.4 CAPSULE ORAL DAILY
Qty: 30 CAPSULE | Refills: 11 | Status: SHIPPED | OUTPATIENT
Start: 2024-05-20 | End: 2025-05-20

## 2024-05-20 RX ORDER — TIOTROPIUM BROMIDE INHALATION SPRAY 3.12 UG/1
2 SPRAY, METERED RESPIRATORY (INHALATION) DAILY
Qty: 30 G | Refills: 11 | Status: SHIPPED | OUTPATIENT
Start: 2024-05-20 | End: 2025-05-20

## 2024-05-20 RX ORDER — ATORVASTATIN CALCIUM 40 MG/1
40 TABLET, FILM COATED ORAL NIGHTLY
Qty: 30 TABLET | Refills: 3 | Status: SHIPPED | OUTPATIENT
Start: 2024-05-20 | End: 2025-05-20

## 2024-05-20 RX ORDER — ACETAMINOPHEN 500 MG
500 TABLET ORAL EVERY 4 HOURS PRN
Qty: 30 TABLET | Refills: 11 | Status: SHIPPED | OUTPATIENT
Start: 2024-05-20

## 2024-05-20 RX ORDER — DICLOFENAC SODIUM 10 MG/G
4 GEL TOPICAL 4 TIMES DAILY
Qty: 100 G | Refills: 1 | Status: SHIPPED | OUTPATIENT
Start: 2024-05-20

## 2024-05-20 RX ORDER — DICLOFENAC SODIUM 10 MG/G
4 GEL TOPICAL 4 TIMES DAILY
Qty: 100 G | Refills: 1 | Status: SHIPPED | OUTPATIENT
Start: 2024-05-20 | End: 2024-05-20 | Stop reason: SDUPTHER

## 2024-05-20 RX ORDER — ALBUTEROL SULFATE 90 UG/1
2 AEROSOL, METERED RESPIRATORY (INHALATION) EVERY 4 HOURS PRN
Qty: 18 G | Refills: 2 | Status: SHIPPED | OUTPATIENT
Start: 2024-05-20 | End: 2025-05-20

## 2024-05-20 RX ORDER — TORSEMIDE 20 MG/1
40 TABLET ORAL DAILY
Qty: 180 TABLET | Refills: 1 | Status: SHIPPED | OUTPATIENT
Start: 2024-05-20 | End: 2024-11-16

## 2024-05-20 ASSESSMENT — ENCOUNTER SYMPTOMS
LIGHT-HEADEDNESS: 0
SHORTNESS OF BREATH: 0
DIZZINESS: 0
OCCASIONAL FEELINGS OF UNSTEADINESS: 0
COUGH: 0
LOSS OF SENSATION IN FEET: 0
BACK PAIN: 1
PALPITATIONS: 0
ARTHRALGIAS: 1
DEPRESSION: 0
ABDOMINAL DISTENTION: 1

## 2024-05-20 ASSESSMENT — PATIENT HEALTH QUESTIONNAIRE - PHQ9
1. LITTLE INTEREST OR PLEASURE IN DOING THINGS: NOT AT ALL
2. FEELING DOWN, DEPRESSED OR HOPELESS: NOT AT ALL
SUM OF ALL RESPONSES TO PHQ9 QUESTIONS 1 AND 2: 0

## 2024-05-20 ASSESSMENT — PAIN SCALES - GENERAL: PAINLEVEL: 8

## 2024-05-20 NOTE — PROGRESS NOTES
"  [facility]    HPI:  Bereket Ogden is a 79 y.o. male with PMHx of Stage C systolic HF/NICM/HFrEF (TTE 3/23 EF 25-30%), 2:1 AV block s/p ICD, HTN, nonobstructive CAD, ascending aortic dilatation (4.1 cm on CT 03/2023), h/o RLE DVT (setting of COVID; 12/2021), BPH, and CKD (baseline Scr ~1.5), Aflutter s/p CTI RFA (2018), previous respiratory failure requiring oxygen supplementation (setting of COVID PNA; 12/2022) presenting for a follow up appointment.     The patient primarily complains of chronic knee pain that he says has been going on for the past 50 years. He states that it is worse with movement, and that his home tylenol typically helps him.     Patient follows with Dr. Story from , and most recently saw him on 2/15/24. The patient reports that he is taking his torsemide, but that he often forgets his evening dose. He states he feels like he occasionally is \"filling up with water\", particularly in his abdomen. He otherwise denies any chest pain, SOB, lightheadedness, dizziness, syncope. He does not check his BP at home.      Medications:    Current Outpatient Medications:     acetaminophen (Tylenol) 500 mg tablet, Take 1 tablet (500 mg) by mouth., Disp: , Rfl:     aspirin 81 mg chewable tablet, Chew 1 tablet (81 mg) once daily., Disp: 30 tablet, Rfl: 3    atorvastatin (Lipitor) 40 mg tablet, Take 1 tablet (40 mg) by mouth once daily at bedtime., Disp: 30 tablet, Rfl: 3    carvedilol (Coreg) 6.25 mg tablet, Take 1 tablet (6.25 mg) by mouth 2 times a day with meals., Disp: 60 tablet, Rfl: 11    losartan (Cozaar) 50 mg tablet, Take 1 tablet (50 mg) by mouth once daily., Disp: 30 tablet, Rfl: 11    Spiriva Respimat 2.5 mcg/actuation inhaler, Inhale 2 puffs once daily., Disp: 1 each, Rfl: 3    tamsulosin (Flomax) 0.4 mg 24 hr capsule, Take 1 capsule (0.4 mg) by mouth once daily., Disp: 30 capsule, Rfl: 11    torsemide (Demadex) 20 mg tablet, Take 2 tablets (40 mg) by mouth once " daily., Disp: 180 tablet, Rfl: 1    Ventolin HFA 90 mcg/actuation inhaler, Inhale 2 puffs every 4 hours if needed for wheezing., Disp: 18 g, Rfl: 2    Allergies:  Allergies   Allergen Reactions    Penicillins Anaphylaxis       Review of systems:  Review of Systems   Respiratory:  Negative for cough and shortness of breath.    Cardiovascular:  Negative for chest pain, palpitations and leg swelling.   Gastrointestinal:  Positive for abdominal distention.   Musculoskeletal:  Positive for arthralgias, back pain and gait problem.   Neurological:  Negative for dizziness and light-headedness.     Vitals:  Vitals:    05/20/24 1335   BP: 94/68   Pulse: 95   Temp: 36.3 °C (97.3 °F)   SpO2: 95%       Physical exam:  Constitutional: Well-developed male in no acute distress.  HEENT: Normocephalic, atraumatic. PERRL. EOMI. No cervical lymphadenopathy.  Respiratory: CTA bilaterally. No wheezes, rales, or rhonchi. Normal respiratory effort.  Cardiovascular: RRR. No murmurs, gallops, or rubs. No JVD. Radial pulses 2+.  Abdominal: Soft, nondistended, nontender to palpation. Bowel sounds present. No hepatosplenomegaly or masses. No CVA tenderness.  Neuro: CN II-XII intact. UE and LE strength 5/5 bilaterally and sensation intact. Normal FTN testing.  MSK: No LE edema bilaterally.  Skin: Warm, dry. No rashes or wounds.  Psych: Appropriate mood and affect.    Labs:  No results found for this or any previous visit (from the past 24 hour(s)).    Imaging:  No results found.    Assessment and Plan:  Bereket Ogden is a 79 y.o. male with PMHx of Stage C systolic HF/NICM/HFrEF (TTE 3/23 EF 25-30%), 2:1 AV block s/p ICD, Aflutter s/p CTI RFA (2018)HTN, nonobstructive CAD, ascending aortic dilatation (4.1 cm on CT 03/2023), h/o RLE DVT (setting of COVID; 12/2021), BPH, and CKD (baseline Scr ~1.5), previous respiratory failure requiring oxygen supplementation (setting of COVID PNA; 12/2022) presenting to [facility] for follow up. Will reduce  patient's Losartan dose given patient's softer BP (recheck 92/68). Patient remains asymptomatic from this.       #AHRF  #Stage C systolic HF/NICM/HFrEF (TTE 3/23 EF 25-30%)   #2:1 AV block s/p ICD  #Aflutter s/p CTI RFA (2018)  #HTN  -Instructed on dietary discretion in terms of salt and daily weight check   -Pt refused lab work today, will attempt to check RFP next visit    -Refilled home oxygen   -Current GDMT regimen - Losartan 50, Coreg 6.125, torsemide 40 mg (will reduce Losartan to 25 mg)   -Followed by HF (Dr Story), next appt 08/15/2024     #CAD, nonobstructive   :: Cath 10/2020 w/ mild RCA stenosis 40-50%   -c/w ASA 81 mg daily, atorvastatin 40 mg daily, beta blocker      #Ascending aortic dilatation (4.1 cm on CT 03/2023)  :: Stable on most recent CT. Seen by CTS and recommended no addition follow up  -Repeat screening 03/2025     #BPH  -C/w tamsulosin 0.4      #CKD (baseline Scr ~1.5)  :: Likely in the setting of HTN. HA1C 5.7% 2021.   - Pt refused repeat labs today      #COPD  -Chart diagnosis of COPD, however PFTs from 10/2020 w/ no airway obstruction  - C/w albuterol prn, Spiriva 2 puffs daily    # Health maintenance  - Last HbA1c: 5.7 (2021)  - Vaccinations: Pt refused Pneumovax and shingles vaccine   - Colonoscopy: 2015 negative     Patient and plan discussed with attending physician.     Rossy Vega MD  PGY-1 Internal Medicine  [facility]

## 2024-05-20 NOTE — PATIENT INSTRUCTIONS
Wilfredo Ogden,    It was our pleasure taking care of you in the [facility] today! Here are the things we discussed:       Your blood pressure was a little low today, so we are going to change the dose of one of your blood pressure medications. Decrease the dose of your Losartan from 50 mg to 25 mg.   2.   For your knee pain, you can try a knee gel     Please reach out to the clinic with any questions or concerns at [phone]. We will see you back in clinic in 1 month.

## 2024-06-03 NOTE — PROGRESS NOTES
I saw and evaluated the patient. I personally obtained the key and critical portions of the history and physical exam or was physically present for key and critical portions performed by the resident/fellow. I reviewed the resident/fellow's documentation and discussed the patient with the resident/fellow. I agree with the resident/fellow's medical decision making as documented in the note.    Keyana Rodas MD MPH

## 2024-08-15 ENCOUNTER — OFFICE VISIT (OUTPATIENT)
Dept: CARDIOLOGY | Facility: HOSPITAL | Age: 79
End: 2024-08-15
Payer: MEDICAID

## 2024-08-15 VITALS
SYSTOLIC BLOOD PRESSURE: 115 MMHG | WEIGHT: 223.2 LBS | BODY MASS INDEX: 31.25 KG/M2 | OXYGEN SATURATION: 94 % | HEART RATE: 80 BPM | DIASTOLIC BLOOD PRESSURE: 77 MMHG | HEIGHT: 71 IN

## 2024-08-15 DIAGNOSIS — I42.8 NONISCHEMIC CARDIOMYOPATHY (MULTI): ICD-10-CM

## 2024-08-15 DIAGNOSIS — I10 ESSENTIAL HYPERTENSION: ICD-10-CM

## 2024-08-15 DIAGNOSIS — I50.20 HFREF (HEART FAILURE WITH REDUCED EJECTION FRACTION) (MULTI): Primary | ICD-10-CM

## 2024-08-15 DIAGNOSIS — I77.810 AORTIC ROOT DILATION (CMS-HCC): ICD-10-CM

## 2024-08-15 PROCEDURE — 99213 OFFICE O/P EST LOW 20 MIN: CPT | Performed by: INTERNAL MEDICINE

## 2024-08-15 PROCEDURE — 1126F AMNT PAIN NOTED NONE PRSNT: CPT | Performed by: INTERNAL MEDICINE

## 2024-08-15 PROCEDURE — 3078F DIAST BP <80 MM HG: CPT | Performed by: INTERNAL MEDICINE

## 2024-08-15 PROCEDURE — 3074F SYST BP LT 130 MM HG: CPT | Performed by: INTERNAL MEDICINE

## 2024-08-15 PROCEDURE — 1159F MED LIST DOCD IN RCRD: CPT | Performed by: INTERNAL MEDICINE

## 2024-08-15 PROCEDURE — 4004F PT TOBACCO SCREEN RCVD TLK: CPT | Performed by: INTERNAL MEDICINE

## 2024-08-15 PROCEDURE — 93005 ELECTROCARDIOGRAM TRACING: CPT | Performed by: INTERNAL MEDICINE

## 2024-08-15 PROCEDURE — 1160F RVW MEDS BY RX/DR IN RCRD: CPT | Performed by: INTERNAL MEDICINE

## 2024-08-15 ASSESSMENT — PATIENT HEALTH QUESTIONNAIRE - PHQ9
2. FEELING DOWN, DEPRESSED OR HOPELESS: NOT AT ALL
1. LITTLE INTEREST OR PLEASURE IN DOING THINGS: NOT AT ALL
SUM OF ALL RESPONSES TO PHQ9 QUESTIONS 1 AND 2: 0

## 2024-08-15 ASSESSMENT — PAIN SCALES - GENERAL: PAINLEVEL: 0-NO PAIN

## 2024-08-15 ASSESSMENT — ENCOUNTER SYMPTOMS
LOSS OF SENSATION IN FEET: 0
DEPRESSION: 0
OCCASIONAL FEELINGS OF UNSTEADINESS: 1

## 2024-08-15 NOTE — PROGRESS NOTES
[facility]  Primary Care Physician: Marvel Pinon MD  Referring Provider: Bishnu      Date of Visit: 08/15/2024  1:40 PM EDT  Location of visit: [facility]     HPI:   Mr. Ogden is an 79M with a PMHx sig for stage C systolic HF/NICM/HFrEF with severe LV dysfunction currently without an ICD, HTN, nonobstructive CAD, ascending aortic dilatation, h/o RLE DVT (setting of COVID; 12/2021), BPH, and CKD who returns to the Advanced Heart Failure clinic for ongoing evaluation and management.        Interval hx:   Currently denies chest pain, palpitations, shortness of breath, dyspnea on exertion, orthopnea, PND. No edema noted in BLE. Patient denies headaches, dizziness or recent falls.       Hospitalizations: Denies  Medication adherence: Reports adherence       SocHx:   Denies smoking, ETOH, illicits, lives alone, has sister and daughter in the area    FamHx:   Mother had CAD      Current Outpatient Medications   Medication Sig Dispense Refill    acetaminophen (Tylenol) 500 mg tablet Take 1 tablet (500 mg) by mouth every 4 hours if needed for mild pain (1 - 3). 30 tablet 11    aspirin 81 mg chewable tablet Chew 1 tablet (81 mg) once daily. 30 tablet 3    atorvastatin (Lipitor) 40 mg tablet Take 1 tablet (40 mg) by mouth once daily at bedtime. 30 tablet 3    carvedilol (Coreg) 6.25 mg tablet Take 1 tablet (6.25 mg) by mouth 2 times daily (morning and late afternoon). 60 tablet 11    diclofenac sodium (Voltaren) 1 % gel Apply 4.5 inches (4 g) topically 4 times a day. 100 g 1    losartan (Cozaar) 50 mg tablet Take 0.5 tablets (25 mg) by mouth once daily. 15 tablet 11    Spiriva Respimat 2.5 mcg/actuation inhaler Inhale 2 puffs once daily. 30 g 11    tamsulosin (Flomax) 0.4 mg 24 hr capsule Take 1 capsule (0.4 mg) by mouth once daily. 30 capsule 11    torsemide (Demadex) 20 mg tablet Take 2 tablets (40 mg) by mouth once daily. 180 tablet 1    Ventolin HFA 90  mcg/actuation inhaler Inhale 2 puffs every 4 hours if needed for wheezing. 18 g 2     No current facility-administered medications for this visit.       Allergies   Allergen Reactions    Penicillins Anaphylaxis        Physical Exam:  On exam Mr. Ogden appears his stated age, is alert and oriented x3, and in no acute distress. His sclera are anicteric and his oropharynx has moist mucous membranes. His neck is supple and without thyromegaly. The JVP is ~5 cm of water above the right atrium. His cardiac exam has regular rhythm, normal S1, S2. No S3/4. There are no murmurs. His lungs are clear to auscultation bilaterally and there is no dullness to percussion. His abdomen is soft, nontender with normoactive bowel sounds. There is no HJR. The extremities are warm and without edema. The skin is dry. There is no rash present. The distal pulses are 2+ in all four extremities. His mood and affect are appropriate for todays encounter.       Cardiac Labs/Diagnostics:    Lab Results   Component Value Date    CREATININE 1.64 (H) 01/24/2024    BUN 27 (H) 01/24/2024     01/24/2024    K 4.7 01/24/2024     01/24/2024    CO2 24 01/24/2024        Recent Labs     02/15/24  1603 01/24/24  1438 12/20/21  0246 12/17/21  0646 12/16/21  1824   * 1,521* 111* 61 68       ECG (8/15/24):  Sinus rhythm (HR 83), IVCD    ECG (11/9/23):  A-sense, V-paced ()    CT chest (3/8/23):  1.  There is stable mild thoracic aneurysmal dilatation of the aortic root and ascending aorta. Recommend surveillance with 2 year echocardiogram or contrasted chest CT.  2. Extensive parenchymal changes suggest prior COVID 19 infection  3. Fluid-filled esophagus and correlate with esophageal dysmotility/reflux.    Echo (3/8/23):  1. Left ventricular systolic function is severely decreased with a 25-30% estimated ejection fraction.  2. Spectral Doppler shows an impaired relaxation pattern of left ventricular diastolic filling.  3. The left  atrium is mild to moderately dilated.  4. There is global hypokinesis of the left ventricle with minor regional variations.  5. There is moderate dilatation of the aortic root.  6. There is moderate dilatation of the ascending aorta.    Cardiac cath (10/14/20):  1. Right dominant system.  2. Mid RCA stenosis of 40-50%.   3. Non-obstructive coronary artery disease.  4. Normal filling pressure with RA pressure of 4 mmHG and PCWP of 13 mmHG.      Impression/Plan:  Mr. Ogden is an 79M with a PMHx sig for stage C systolic HF/NICM/HFrEF with severe LV dysfunction currently without an ICD, HTN, nonobstructive CAD, ascending aortic dilatation, h/o RLE DVT (setting of COVID; 12/2021), BPH, and CKD who returns to the Advanced Heart Failure clinic for ongoing evaluation and management. At the current time he has functional class II symptoms and appears euvolemic on exam.     1) Stage C chronic systolic HF/NICM/HFrEF with severe LV dysfunction (LVEF 25-30%; 3/2023) currently without an ICD  He did not get his labs as requested. Currently tolerating his GDMT.   -c/w carvedilol 6.25 mg bid, losartan 50 mg daily, torsemide 40 mg daily  -repeat an echo before follow up to reassess    2) HTN  -as per #1    3) Nonobstructive CAD  Denies angina. Most recent LDL 59 (8/2021).   -c/w ASA 81 mg daily, atorvastatin 40 mg daily, beta blocker    4) Ascending aortic dilatation  Seen by Dr. Goetz; no intervention at the current time. Need to ensure optimal BP control, as also per #1.  -will monitor with echo      F/U: 6 months at /[place] [address]    ____________________________________________________________  Marek Story DO  Section of Advanced Heart Failure and Cardiac Transplantation  Division of Cardiovascular Medicine  [facility]  [facility]

## 2024-08-15 NOTE — PATIENT INSTRUCTIONS
It was a pleasure seeing you today. Please contact myself or my team with any questions.     To reach Dr. Story' office please call [phone] ([place]).   Fax: [phone]   To schedule an appointment call [phone]     If you have any questions or need cardiac medication refills, please call the Heart Failure office at [phone], option 6. You may also contact the  Heart Failure Nursing team via email at [email or web address] (Please include your name and date of birth).        1) Continue your current medications  2) We will repeat your echocardiogram in 6 months. To schedule call [phone].  3) Follow up in 6 months at /[place]   no

## 2024-08-16 LAB
ATRIAL RATE: 83 BPM
P AXIS: 45 DEGREES
P OFFSET: 180 MS
P ONSET: 119 MS
PR INTERVAL: 156 MS
Q ONSET: 197 MS
QRS COUNT: 14 BEATS
QRS DURATION: 138 MS
QT INTERVAL: 416 MS
QTC CALCULATION(BAZETT): 488 MS
QTC FREDERICIA: 463 MS
R AXIS: 262 DEGREES
T AXIS: 103 DEGREES
T OFFSET: 405 MS
VENTRICULAR RATE: 83 BPM

## 2024-08-28 ENCOUNTER — OFFICE VISIT (OUTPATIENT)
Dept: PRIMARY CARE | Facility: HOSPITAL | Age: 79
End: 2024-08-28
Payer: MEDICAID

## 2024-08-28 VITALS
OXYGEN SATURATION: 95 % | SYSTOLIC BLOOD PRESSURE: 94 MMHG | HEART RATE: 86 BPM | DIASTOLIC BLOOD PRESSURE: 64 MMHG | TEMPERATURE: 97.8 F | HEIGHT: 71 IN | WEIGHT: 219 LBS | BODY MASS INDEX: 30.66 KG/M2

## 2024-08-28 DIAGNOSIS — Z13.9 SCREENING DUE: Primary | ICD-10-CM

## 2024-08-28 DIAGNOSIS — M15.9 OSTEOARTHRITIS OF MULTIPLE JOINTS, UNSPECIFIED OSTEOARTHRITIS TYPE: ICD-10-CM

## 2024-08-28 ASSESSMENT — ENCOUNTER SYMPTOMS
LOSS OF SENSATION IN FEET: 0
OCCASIONAL FEELINGS OF UNSTEADINESS: 0
DEPRESSION: 0

## 2024-08-28 ASSESSMENT — PATIENT HEALTH QUESTIONNAIRE - PHQ9
2. FEELING DOWN, DEPRESSED OR HOPELESS: NOT AT ALL
SUM OF ALL RESPONSES TO PHQ9 QUESTIONS 1 AND 2: 0
1. LITTLE INTEREST OR PLEASURE IN DOING THINGS: NOT AT ALL

## 2024-08-28 ASSESSMENT — PAIN SCALES - GENERAL: PAINLEVEL: 8

## 2024-08-28 NOTE — PROGRESS NOTES
HPI:  Bereket Ogden is a 79 y.o. male with PMHx of Stage C systolic HF/NICM/HFrEF (TTE 3/23 EF 25-30%), 2:1 AV block s/p PPM, Aflutter s/p CTI RFA (2018)HTN, nonobstructive CAD, ascending aortic dilatation (4.1 cm on CT 03/2023), h/o RLE DVT (setting of COVID; 12/2021), BPH, and CKD (baseline Scr ~1.5), previous respiratory failure requiring oxygen supplementation (setting of COVID PNA; 12/2022) presenting to [facility] for follow up.     He was last seen at [facility] 5/20/24 for which losartan was reduced from 50 to 25 for asymptomatic hypotension. He also refused basic labs.    He then saw HF Dr. Story who continued GDMT regimen and ordered TTE to evaluate EF and aortic dilation.    Today patient states he is here for a lift chair. He states he has had L hip and R knee pain for 10+ years that has gotten to the point where he can't walk with a cane. He states he takes tylenol for his hip pain. He says on a real bad day he takes 10 or more pills per day when the pain. He states he does not want more medications for his pain and the gel does not help.     He states he has an aid that comes 3 times a week to help him but also has difficulties dressing himself etc by himself.     Denies CP, SOB, worsening swelling, dizziness/lightheadedness, syncope.       Medications:    Current Outpatient Medications:     acetaminophen (Tylenol) 500 mg tablet, Take 1 tablet (500 mg) by mouth every 4 hours if needed for mild pain (1 - 3)., Disp: 30 tablet, Rfl: 11    aspirin 81 mg chewable tablet, Chew 1 tablet (81 mg) once daily., Disp: 30 tablet, Rfl: 3    atorvastatin (Lipitor) 40 mg tablet, Take 1 tablet (40 mg) by mouth once daily at bedtime., Disp: 30 tablet, Rfl: 3    carvedilol (Coreg) 6.25 mg tablet, Take 1 tablet (6.25 mg) by mouth 2 times daily (morning and late afternoon)., Disp: 60 tablet, Rfl: 11    diclofenac sodium (Voltaren) 1 % gel, Apply 4.5 inches (4 g) topically 4 times a day., Disp: 100 g, Rfl: 1    losartan  "(Cozaar) 50 mg tablet, Take 0.5 tablets (25 mg) by mouth once daily. (Patient taking differently: Take 1 tablet (50 mg) by mouth once daily.), Disp: 15 tablet, Rfl: 11    Spiriva Respimat 2.5 mcg/actuation inhaler, Inhale 2 puffs once daily., Disp: 30 g, Rfl: 11    tamsulosin (Flomax) 0.4 mg 24 hr capsule, Take 1 capsule (0.4 mg) by mouth once daily., Disp: 30 capsule, Rfl: 11    torsemide (Demadex) 20 mg tablet, Take 2 tablets (40 mg) by mouth once daily., Disp: 180 tablet, Rfl: 1    Ventolin HFA 90 mcg/actuation inhaler, Inhale 2 puffs every 4 hours if needed for wheezing., Disp: 18 g, Rfl: 2    Allergies:  Allergies   Allergen Reactions    Penicillins Anaphylaxis       Review of systems:  Per HPI    Vitals:  BP 94/64   Pulse 86   Temp 36.6 °C (97.8 °F) (Temporal)   Ht 1.803 m (5' 11\")   Wt 99.3 kg (219 lb)   SpO2 95%   BMI 30.54 kg/m²       Physical exam:  Constitutional: Well-developed male in no acute distress.  HEENT: Normocephalic, atraumatic. PERRL. EOMI. No cervical lymphadenopathy.  Respiratory: No increased WOB. Some crackles at bases b/l  Cardiovascular: RRR. No murmurs, gallops, or rubs. No JVD. Radial pulses 2+.  Abdominal: Soft, nondistended, nontender to palpation. Bowel sounds present. No hepatosplenomegaly or masses. No CVA tenderness.  Neuro: CN II-XII intact. UE and LE strength 5/5 bilaterally and sensation intact. Normal FTN testing.  MSK: Trace LE edema bilaterally.  Skin: Warm, dry. No rashes or wounds.  Psych: Appropriate mood and affect.    Labs:  No results found for this or any previous visit (from the past 24 hour(s)).    Imaging:  ECG 12 lead (Clinic Performed)    Result Date: 8/16/2024  Normal sinus rhythm Nonspecific intraventricular block Possible Lateral infarct , age undetermined Abnormal ECG When compared with ECG of 09-NOV-2023 14:52, Sinus rhythm has replaced Electronic ventricular pacemaker      Assessment and plan:  Bereket Ogden is a 79 y.o. male with PMHx of Stage " C systolic HF/NICM/HFrEF (TTE 3/23 EF 25-30%), 2:1 AV block s/p ICD, Aflutter s/p CTI RFA (2018)HTN, nonobstructive CAD, ascending aortic dilatation (4.1 cm on CT 03/2023), h/o RLE DVT (setting of COVID; 12/2021), BPH, and CKD (baseline Scr ~1.5), previous respiratory failure requiring oxygen supplementation (setting of COVID PNA; 12/2022) presenting to [facility] for follow up.    Updates 8/28/24:   -Ordered home PT and OT to assist with ADLs and assess for need for further devices at home  -Ordered DME (chair riser and toilet riser)  -Ordered labs for patient to get done at his convenience  -Patient would like to call Dr. Story to ask if he needs his repeat TTE      #AHRF  #Stage C systolic HF/NICM/HFrEF (TTE 3/23 EF 25-30%)   #2:1 AV block s/p ICD  #Aflutter s/p CTI RFA (2018)  #HTN  -Instructed on dietary discretion in terms of salt and daily weight check   -Pt refused lab work today, will attempt to check RFP next visit    -Refilled home oxygen   -Current GDMT regimen - Losartan 50, Coreg 6.125, torsemide 40 mg  -Followed by HF (Dr Story), next appt 02/27/25     #CAD, nonobstructive   :: Cath 10/2020 w/ mild RCA stenosis 40-50%   -c/w ASA 81 mg daily, atorvastatin 40 mg daily, beta blocker      #Ascending aortic dilatation (4.1 cm on CT 03/2023)  :: Stable on most recent CT. Seen by CTS and recommended no addition follow up  -Repeat screening 03/2025     #BPH  -C/w tamsulosin 0.4      #CKD (baseline Scr ~1.5)  :: Likely in the setting of HTN. HA1C 5.7% 2021.   - Pt agrees to get them at his convenience      #COPD  -Chart diagnosis of COPD, however PFTs from 10/2020 w/ no airway obstruction  - C/w albuterol prn, Spiriva 2 puffs daily     # Health maintenance  - last lipid panel (2021) HDL 37.9, LDL 59, tchol 125, trig 139  - Last HbA1c: 5.7 (2021)  - Vaccinations: Pt refused Pneumovax and shingles vaccine, states he got them   - Colonoscopy: 2015 negative, repeat in 5 years however patient is 79    Follow-up in  6 week with tele-visit.    Patient and plan discussed with attending physician, Dr. Glez.    Jun Garrett MD  PGY-2 Internal Medicine  [facility]

## 2024-08-28 NOTE — PATIENT INSTRUCTIONS
As discussed today, our plan is:     Labs - you can get this done today or come back anytime in the next 4-6 weeks at any  facility. Please also call Dr. Story (your heart doctor) to see if he would like you to get your echocardiogram done before your next visit with him.  Because of your knee and hip pain, we have referred you to physical and occupational therapy to come to your home to help you with your activities of daily living. We have also order a chair and toilet riser for you to , please ask your pharmacy where the nearest location is to pick these up from.  Be sure to follow up with your heart doctor Dr. Story in February.    Please come back to see me in: 6-week tele visit  ------  If you have any problems or questions, please contact the clinic at [phone] to leave a message. Our fax number is [phone]. If your issue cannot wait until the next business day, please go to urgent care or the emergency department.     I also strongly urge all of my patients to register for [employer] by going to: [email or web address]  (The  staff can also send you a text/email link to register when you check out).    No shows: It is understandable if you are unable to make it to a visit, but please cancel your appointment instead of not showing up. This helps to give other patients access to primary care and keeps wait times low.      Dr. Jun Garrett

## 2024-08-29 ENCOUNTER — TELEPHONE (OUTPATIENT)
Dept: HOME HEALTH SERVICES | Facility: HOME HEALTH | Age: 79
End: 2024-08-29
Payer: MEDICAID

## 2024-08-29 NOTE — TELEPHONE ENCOUNTER
Hello,      Thank you for the home health referral,  [facility] received your referral  In order for us to move forward with processing the referral, the patient visit note dated for 8/28/274  requires your signature because you are the supervising/attending provider. Per policy, we will close the referral if the visit note is not signed by 9/1/24 Thank you.    Gabby Carranza LPN  Ambulatory [facility] Intake Center  [facility]  [address] [place] [address]  [phone]

## 2024-08-30 ENCOUNTER — DOCUMENTATION (OUTPATIENT)
Dept: HOME HEALTH SERVICES | Facility: HOME HEALTH | Age: 79
End: 2024-08-30
Payer: MEDICAID

## 2024-08-30 ENCOUNTER — HOME HEALTH ADMISSION (OUTPATIENT)
Dept: HOME HEALTH SERVICES | Facility: HOME HEALTH | Age: 79
End: 2024-08-30
Payer: MEDICAID

## 2024-08-30 NOTE — HH CARE COORDINATION
Home Care received a Referral for Physical Therapy and Occupational Therapy. We have processed the referral for a Start of Care on 08-31-24, 24-48 hours.     If you have any questions or concerns, please feel free to contact us at [phone]. Follow the prompts, enter your five digit zip code, and you will be directed to your care team on [facility].

## 2024-09-03 ENCOUNTER — HOME CARE VISIT (OUTPATIENT)
Dept: HOME HEALTH SERVICES | Facility: HOME HEALTH | Age: 79
End: 2024-09-03
Payer: MEDICAID

## 2024-09-03 VITALS
RESPIRATION RATE: 18 BRPM | TEMPERATURE: 98 F | OXYGEN SATURATION: 94 % | DIASTOLIC BLOOD PRESSURE: 99 MMHG | SYSTOLIC BLOOD PRESSURE: 118 MMHG | HEART RATE: 101 BPM

## 2024-09-03 PROCEDURE — G0151 HHCP-SERV OF PT,EA 15 MIN: HCPCS

## 2024-09-03 SDOH — HEALTH STABILITY: MENTAL HEALTH: SMOKING IN HOME: 0

## 2024-09-03 SDOH — ECONOMIC STABILITY: HOUSING INSECURITY: EVIDENCE OF SMOKING MATERIAL: 0

## 2024-09-03 SDOH — HEALTH STABILITY: PHYSICAL HEALTH: EXERCISE COMMENTS: SEATED THER EX: X 10 REPS  -ANKLE PUMPS  -LAQ  -MARCHES  - HAMSTRING STRETCH 1 X 10 SECONDS BIL

## 2024-09-03 ASSESSMENT — ENCOUNTER SYMPTOMS
PERSON REPORTING PAIN: PATIENT
PAIN LOCATION: RIGHT HIP
PAIN: 1
PAIN LOCATION - PAIN SEVERITY: 7/10

## 2024-09-03 ASSESSMENT — ACTIVITIES OF DAILY LIVING (ADL)
AMBULATION ASSISTANCE ON FLAT SURFACES: 1
AMBULATION_DISTANCE/DURATION_TOLERATED: 50 FEET
OASIS_M1830: 03
ENTERING_EXITING_HOME: CONTACT GUARD ASSIST

## 2024-09-04 ENCOUNTER — HOME CARE VISIT (OUTPATIENT)
Dept: HOME HEALTH SERVICES | Facility: HOME HEALTH | Age: 79
End: 2024-09-04
Payer: MEDICAID

## 2024-09-04 VITALS
HEART RATE: 89 BPM | SYSTOLIC BLOOD PRESSURE: 124 MMHG | OXYGEN SATURATION: 96 % | TEMPERATURE: 97.9 F | DIASTOLIC BLOOD PRESSURE: 64 MMHG

## 2024-09-04 PROCEDURE — G0152 HHCP-SERV OF OT,EA 15 MIN: HCPCS

## 2024-09-04 ASSESSMENT — ENCOUNTER SYMPTOMS
PAIN LOCATION - PAIN SEVERITY: 5/10
PAIN LOCATION: LEFT HIP
PAIN LOCATION - PAIN DURATION: INTERMITTENT
PAIN: 1
PAIN LOCATION - RELIEVING FACTORS: TYLENOL, REST
PERSON REPORTING PAIN: PATIENT
PAIN LOCATION - PAIN QUALITY: SHARP
PAIN LOCATION - PAIN FREQUENCY: INTERMITTENT
PAIN LOCATION - PAIN SEVERITY: 2/10
PAIN LOCATION - PAIN FREQUENCY: INTERMITTENT
PAIN LOCATION - PAIN SEVERITY: 2/10
PAIN LOCATION - PAIN DURATION: INTERMITTENT
PAIN LOCATION: LEFT ELBOW
PAIN LOCATION - PAIN SEVERITY: 5/10
PAIN LOCATION - PAIN QUALITY: ACHE
PAIN LOCATION - EXACERBATING FACTORS: INCREASED MOVEMENT
PAIN LOCATION - RELIEVING FACTORS: TYLENOL, REST
PAIN LOCATION - PAIN FREQUENCY: INTERMITTENT
PAIN LOCATION - PAIN DURATION: INTERMITTENT
PAIN LOCATION - PAIN FREQUENCY: INTERMITTENT
PAIN LOCATION - EXACERBATING FACTORS: INCREASED ACTIVITY
PAIN LOCATION - EXACERBATING FACTORS: INCREASED ACTIVITY
PAIN LOCATION - RELIEVING FACTORS: TYLENOL, REST
PAIN LOCATION: RIGHT KNEE
PAIN LOCATION - PAIN QUALITY: SHARP
LOWEST PAIN SEVERITY IN PAST 24 HOURS: 0/10
PAIN LOCATION - RELIEVING FACTORS: TYLENOL, REST
PAIN LOCATION - PAIN DURATION: INTERMITTENT
HIGHEST PAIN SEVERITY IN PAST 24 HOURS: 10/10
PAIN LOCATION - PAIN QUALITY: ACHE
PAIN LOCATION: RIGHT ELBOW
PAIN LOCATION - EXACERBATING FACTORS: INCREASED MOVEMENT

## 2024-09-04 ASSESSMENT — ACTIVITIES OF DAILY LIVING (ADL)
BATHING_CURRENT_FUNCTION: STAND BY ASSIST
TOILETING: 1
PHYSICAL_TRANSFERS_DEVICES: SPC
PREPARING MEALS: NEEDS ASSISTANCE
DRESSING_LB_CURRENT_FUNCTION: STAND BY ASSIST
PHYSICAL TRANSFERS ASSESSED: 1
BATHING ASSESSED: 1
TOILETING: INDEPENDENT
CURRENT_FUNCTION: STAND BY ASSIST

## 2024-09-10 ENCOUNTER — HOME CARE VISIT (OUTPATIENT)
Dept: HOME HEALTH SERVICES | Facility: HOME HEALTH | Age: 79
End: 2024-09-10
Payer: MEDICAID

## 2024-09-10 PROCEDURE — G0151 HHCP-SERV OF PT,EA 15 MIN: HCPCS

## 2024-09-10 SDOH — HEALTH STABILITY: PHYSICAL HEALTH
EXERCISE COMMENTS: SEATED THER EX: X 15 REPS  -ANKLE PUMPS  -LAQ  -MARCHES  -HIP ABD/ADD    -HAMSTRING STRETCH 2 X 15 SECONDS

## 2024-09-10 ASSESSMENT — ENCOUNTER SYMPTOMS
DENIES PAIN: 1
PERSON REPORTING PAIN: PATIENT

## 2024-09-12 DIAGNOSIS — I25.10 ATHEROSCLEROSIS OF NATIVE CORONARY ARTERY WITHOUT ANGINA PECTORIS, UNSPECIFIED WHETHER NATIVE OR TRANSPLANTED HEART: Primary | ICD-10-CM

## 2024-09-12 NOTE — PROGRESS NOTES
I received a message from PT regarding ordering a lift chair, patient was seen [place] [facility] [place]28 order was placed at that visit, I reordered the lift chair and faxed the order to the number provided by PT.

## 2024-09-13 ENCOUNTER — HOME CARE VISIT (OUTPATIENT)
Dept: HOME HEALTH SERVICES | Facility: HOME HEALTH | Age: 79
End: 2024-09-13
Payer: MEDICAID

## 2024-09-13 PROCEDURE — G0152 HHCP-SERV OF OT,EA 15 MIN: HCPCS

## 2024-09-15 ASSESSMENT — ENCOUNTER SYMPTOMS
PAIN LOCATION: LEFT ELBOW
PAIN LOCATION: LEFT HAND
PAIN LOCATION: RIGHT KNEE
PAIN LOCATION - PAIN SEVERITY: 5/10
PAIN LOCATION: RIGHT ELBOW
PAIN LOCATION - PAIN SEVERITY: 5/10
PAIN LOCATION: LEFT KNEE
PAIN LOCATION - PAIN SEVERITY: 5/10
PAIN LOCATION: RIGHT HAND
PAIN LOCATION - PAIN SEVERITY: 5/10

## 2024-09-18 ENCOUNTER — HOME CARE VISIT (OUTPATIENT)
Dept: HOME HEALTH SERVICES | Facility: HOME HEALTH | Age: 79
End: 2024-09-18
Payer: MEDICAID

## 2024-09-18 PROCEDURE — G0152 HHCP-SERV OF OT,EA 15 MIN: HCPCS

## 2024-09-18 ASSESSMENT — ENCOUNTER SYMPTOMS
PERSON REPORTING PAIN: PATIENT
PAIN LOCATION - EXACERBATING FACTORS: INCREASED ACTIVITY
PAIN LOCATION - PAIN FREQUENCY: INTERMITTENT
PAIN LOCATION - PAIN SEVERITY: 7/10
PAIN LOCATION: LEFT HIP
PAIN LOCATION - RELIEVING FACTORS: REST, TYLENOL
PAIN: 1
PAIN LOCATION - PAIN DURATION: INTERMITTENT
PAIN LOCATION - PAIN QUALITY: ACHE

## 2024-09-18 NOTE — PROGRESS NOTES
"78 yo man with nonischemic HFrEF (35%) s/p pacemaker placement after ablation of atrial flutter here for follow up.    He is well compensated with very few symptoms of heart failure. His primary complaint is chronic right knee and left hip pain from osteoarthritis. He uses a cane but might benefit from a chair life +/- seat riser.  He admits that his ADLs are suffering related to his limited mobility and pain. He has no family involved in his care, mostly neighbors that help him out.     He is on no medications except for occasional tylenol. No recent labs or echocardiogram.    PE  BP 94/64   Pulse 86   Temp 36.6 °C (97.8 °F) (Temporal)   Ht 1.803 m (5' 11\")   Wt 99.3 kg (219 lb)   SpO2 95%   BMI 30.54 kg/m²   Lungs clear BS bilaterally  CV RRR  Abd soft NT  Ext no edema, moderate swelling right knee with medial joint line tenderness    A/P    Paperwork for durable medical equipment for chair lift begun during the visit.  RTC in 6 months, encouraged to get a flu shot in September.    I saw and evaluated the patient. I personally obtained the key and critical portions of the history and physical exam or was physically present for key and critical portions performed by the resident/fellow. I reviewed the resident/fellow's documentation and discussed the patient with the resident/fellow. I agree with the resident/fellow's medical decision making as documented in the note.    Jazmin Glez MD      "

## 2024-09-19 ENCOUNTER — HOME CARE VISIT (OUTPATIENT)
Dept: HOME HEALTH SERVICES | Facility: HOME HEALTH | Age: 79
End: 2024-09-19
Payer: MEDICAID

## 2024-09-19 VITALS
TEMPERATURE: 97.9 F | HEART RATE: 85 BPM | OXYGEN SATURATION: 97 % | SYSTOLIC BLOOD PRESSURE: 125 MMHG | DIASTOLIC BLOOD PRESSURE: 62 MMHG

## 2024-09-19 PROCEDURE — G0157 HHC PT ASSISTANT EA 15: HCPCS | Mod: CQ

## 2024-09-19 ASSESSMENT — ENCOUNTER SYMPTOMS
HIGHEST PAIN SEVERITY IN PAST 24 HOURS: 6/10
SUBJECTIVE PAIN PROGRESSION: WAXING AND WANING
LOWEST PAIN SEVERITY IN PAST 24 HOURS: 3/10
PAIN LOCATION - PAIN QUALITY: ACHING
PERSON REPORTING PAIN: PATIENT
PAIN LOCATION - PAIN SEVERITY: 5/10
PAIN LOCATION: GENERALIZED
MUSCLE WEAKNESS: 1
PAIN: 1
PAIN SEVERITY GOAL: 0/10

## 2024-09-19 ASSESSMENT — ACTIVITIES OF DAILY LIVING (ADL)
AMBULATION ASSISTANCE ON FLAT SURFACES: 1
AMBULATION ASSISTANCE: STAND BY ASSIST
AMBULATION_DISTANCE/DURATION_TOLERATED: 20 FT
CURRENT_FUNCTION: STAND BY ASSIST

## 2024-09-20 DIAGNOSIS — I50.20 HFREF (HEART FAILURE WITH REDUCED EJECTION FRACTION): Primary | ICD-10-CM

## 2024-09-24 ENCOUNTER — HOME CARE VISIT (OUTPATIENT)
Dept: HOME HEALTH SERVICES | Facility: HOME HEALTH | Age: 79
End: 2024-09-24
Payer: MEDICAID

## 2024-09-24 VITALS
SYSTOLIC BLOOD PRESSURE: 105 MMHG | DIASTOLIC BLOOD PRESSURE: 65 MMHG | HEART RATE: 88 BPM | OXYGEN SATURATION: 99 % | TEMPERATURE: 98.6 F

## 2024-09-24 PROCEDURE — G0157 HHC PT ASSISTANT EA 15: HCPCS | Mod: CQ

## 2024-09-24 ASSESSMENT — ENCOUNTER SYMPTOMS
HIGHEST PAIN SEVERITY IN PAST 24 HOURS: 9/10
MUSCLE WEAKNESS: 1
LOWEST PAIN SEVERITY IN PAST 24 HOURS: 4/10
PAIN LOCATION - PAIN SEVERITY: 8/10
PERSON REPORTING PAIN: PATIENT
LIMITED RANGE OF MOTION: 1
SUBJECTIVE PAIN PROGRESSION: UNCHANGED
PAIN LOCATION: RIGHT ANKLE
PAIN SEVERITY GOAL: 0/10
PAIN: 1

## 2024-09-24 ASSESSMENT — ACTIVITIES OF DAILY LIVING (ADL)
CURRENT_FUNCTION: STAND BY ASSIST
AMBULATION ASSISTANCE: STAND BY ASSIST

## 2024-09-25 ENCOUNTER — APPOINTMENT (OUTPATIENT)
Dept: RADIOLOGY | Facility: HOSPITAL | Age: 79
End: 2024-09-25
Payer: MEDICAID

## 2024-09-25 ENCOUNTER — HOSPITAL ENCOUNTER (EMERGENCY)
Facility: HOSPITAL | Age: 79
Discharge: HOME | End: 2024-09-25
Payer: MEDICAID

## 2024-09-25 ENCOUNTER — HOME CARE VISIT (OUTPATIENT)
Dept: HOME HEALTH SERVICES | Facility: HOME HEALTH | Age: 79
End: 2024-09-25
Payer: MEDICAID

## 2024-09-25 DIAGNOSIS — S93.401A SPRAIN OF RIGHT ANKLE, UNSPECIFIED LIGAMENT, INITIAL ENCOUNTER: Primary | ICD-10-CM

## 2024-09-25 PROCEDURE — 73630 X-RAY EXAM OF FOOT: CPT | Mod: RT

## 2024-09-25 PROCEDURE — 73630 X-RAY EXAM OF FOOT: CPT | Mod: RIGHT SIDE | Performed by: STUDENT IN AN ORGANIZED HEALTH CARE EDUCATION/TRAINING PROGRAM

## 2024-09-25 PROCEDURE — 73590 X-RAY EXAM OF LOWER LEG: CPT | Mod: RT

## 2024-09-25 PROCEDURE — 73610 X-RAY EXAM OF ANKLE: CPT | Mod: RIGHT SIDE | Performed by: STUDENT IN AN ORGANIZED HEALTH CARE EDUCATION/TRAINING PROGRAM

## 2024-09-25 PROCEDURE — 73590 X-RAY EXAM OF LOWER LEG: CPT | Mod: RIGHT SIDE | Performed by: STUDENT IN AN ORGANIZED HEALTH CARE EDUCATION/TRAINING PROGRAM

## 2024-09-25 PROCEDURE — 99284 EMERGENCY DEPT VISIT MOD MDM: CPT | Performed by: NURSE PRACTITIONER

## 2024-09-25 PROCEDURE — 73610 X-RAY EXAM OF ANKLE: CPT | Mod: RT

## 2024-09-25 RX ORDER — ACETAMINOPHEN 325 MG/1
TABLET ORAL
Status: COMPLETED
Start: 2024-09-25 | End: 2024-09-25

## 2024-09-25 RX ORDER — ACETAMINOPHEN 325 MG/1
975 TABLET ORAL ONCE
Status: COMPLETED | OUTPATIENT
Start: 2024-09-25 | End: 2024-09-25

## 2024-09-25 ASSESSMENT — COLUMBIA-SUICIDE SEVERITY RATING SCALE - C-SSRS
1. IN THE PAST MONTH, HAVE YOU WISHED YOU WERE DEAD OR WISHED YOU COULD GO TO SLEEP AND NOT WAKE UP?: NO
2. HAVE YOU ACTUALLY HAD ANY THOUGHTS OF KILLING YOURSELF?: NO
6. HAVE YOU EVER DONE ANYTHING, STARTED TO DO ANYTHING, OR PREPARED TO DO ANYTHING TO END YOUR LIFE?: NO

## 2024-09-25 NOTE — ED TRIAGE NOTES
Pt reports a mechanical fall yesterday. Denies dizziness, CP, lightheadedness prior to fall.  No blood thinners. Has a home health nurse who came yesterday and applied ICE. Advised to come to the ED if pain and swelling continued or worsened.

## 2024-09-25 NOTE — ED PROVIDER NOTES
Chief Complaint   Patient presents with   • Fall       HPI       79 year old male presents to the Emergency Department today complaining of right lower leg pain status post fall that occurred 2 days ago. Notes that he tripped while stepping up on a stair and inverted his right ankle. Has had pain with weightbearing ever since. Denies hitting their head, loss of consciousness, or seizure-like activity. Denies any other injuries.       History provided by:  Patient             Patient History   Past Medical History:   Diagnosis Date   • Calculus of gallbladder without cholecystitis without obstruction 01/05/2018    Gallbladder stone without cholecystitis or obstruction   • Cannabis abuse, in remission 01/21/2019    History of cannabis abuse   • Personal history of colonic polyps     History of colon polyps   • Spondylosis without myelopathy or radiculopathy, lumbar region 11/05/2013    Lumbar spondylosis   • Unspecified fracture of shaft of unspecified tibia, initial encounter for closed fracture 11/05/2013    Closed fracture of tibia     Past Surgical History:   Procedure Laterality Date   • KNEE SURGERY  01/05/2018    Knee Surgery     Family History   Problem Relation Name Age of Onset   • Other (CARDIAC DISORDER) Mother     • Cancer Mother     • Other (TUBERCULOSIS, BRONCHIAL) Father       Social History     Tobacco Use   • Smoking status: Some Days     Types: Cigarettes   • Smokeless tobacco: Never   Substance Use Topics   • Alcohol use: Yes   • Drug use: Not Currently           Physical Exam  Constitutional:       General: He is awake.      Appearance: Normal appearance.   Cardiovascular:      Rate and Rhythm: Normal rate and regular rhythm.      Pulses:           Radial pulses are 3+ on the right side and 3+ on the left side.      Heart sounds: Normal heart sounds. No murmur heard.     No friction rub. No gallop.   Pulmonary:      Effort: Pulmonary effort is normal.      Breath sounds: Normal breath sounds and  air entry.   Musculoskeletal:      Comments: No obvious deformity or ecchymosis noted to the right foot and ankle, but there is edema and tenderness present over the right fifth metatarsal, lateral malleolus, and proximal fibula. Right achilles is intact. Full ROM. Right dorsalis pedis pulse is strong and regular. Capillary refill was within normal limits. Sensation is intact distally.    Neurological:      Mental Status: He is alert.   Psychiatric:         Behavior: Behavior is cooperative.         Labs Reviewed - No data to display    XR foot right 3+ views   Final Result   No acute fractures of the right tibia/fibula, right ankle, or right   foot.        Lateral malleolar soft tissue swelling.        I personally reviewed the images/study and I agree with the findings   as stated by Shon Ferrer MD. This study was interpreted at   [place], [place].        MACRO:   None        Signed by: Jorgito Love 9/25/2024 10:25 AM   Dictation workstation:   [id number]      XR ankle right 3+ views   Final Result   No acute fractures of the right tibia/fibula, right ankle, or right   foot.        Lateral malleolar soft tissue swelling.        I personally reviewed the images/study and I agree with the findings   as stated by Shon Ferrer MD. This study was interpreted at   [place], [place].        MACRO:   None        Signed by: Jorgito Love 9/25/2024 10:25 AM   Dictation workstation:   [id number]      XR tibia fibula right 2 views   Final Result   No acute fractures of the right tibia/fibula, right ankle, or right   foot.        Lateral malleolar soft tissue swelling.        I personally reviewed the images/study and I agree with the findings   as stated by Shon Ferrer MD. This study was interpreted at   [place], [place].        MACRO:   None        Signed by: Jorgito Love 9/25/2024 10:25  AM   Dictation workstation:   [id number]               ED Course & MDM   Diagnoses as of 09/25/24 1250   Sprain of right ankle, unspecified ligament, initial encounter           Medical Decision Making  Patient was seen and evaluated by myself. Right foot, ankle, and tibia/fibula x-rays show no acute fractures of the right tibia/fibula, right ankle, or right foot. Instructed to ice and elevate the sore area as much as possible.  Take Tylenol and Advil over the counter as needed for fever and/or pain. No contraindications to NSAIDs are noted. Placed in an Aircast splint by staff to wear for comfort. Capillary refill was within normal limits and sensation was intact distally post-application. To be nonweightbearing as tolerated. Follow up with their doctor in 1 week. Return if worse in any way. Discharged in stable condition with computer instructions.    Diagnostic Impression:     1. Acute right ankle sprain                 Your medication list        CONTINUE taking these medications        Instructions Last Dose Given Next Dose Due   miscellaneous medical supply [facility]      Lift chair at home              ASK your doctor about these medications        Instructions Last Dose Given Next Dose Due   acetaminophen 500 mg tablet  Commonly known as: Tylenol      Take 1 tablet (500 mg) by mouth every 4 hours if needed for mild pain (1 - 3).       aspirin 81 mg chewable tablet      Chew 1 tablet (81 mg) once daily.       atorvastatin 40 mg tablet  Commonly known as: Lipitor      Take 1 tablet (40 mg) by mouth once daily at bedtime.       carvedilol 6.25 mg tablet  Commonly known as: Coreg      Take 1 tablet (6.25 mg) by mouth 2 times daily (morning and late afternoon).       diclofenac sodium 1 % gel  Commonly known as: Voltaren      Apply 4.5 inches (4 g) topically 4 times a day.       losartan 50 mg tablet  Commonly known as: Cozaar      Take 0.5 tablets (25 mg) by mouth once daily.       oxygen gas therapy  Commonly  known as: O2           Spiriva Respimat 2.5 mcg/actuation inhaler  Generic drug: tiotropium      Inhale 2 puffs once daily.       tamsulosin 0.4 mg 24 hr capsule  Commonly known as: Flomax      Take 1 capsule (0.4 mg) by mouth once daily.       torsemide 20 mg tablet  Commonly known as: Demadex      Take 2 tablets (40 mg) by mouth once daily.       Ventolin HFA 90 mcg/actuation inhaler  Generic drug: albuterol      Inhale 2 puffs every 4 hours if needed for wheezing.                  Procedure  Procedures     Geovanny Eubanks, WEI-CNP  09/25/24 [id number]

## 2024-09-30 ENCOUNTER — HOME CARE VISIT (OUTPATIENT)
Dept: HOME HEALTH SERVICES | Facility: HOME HEALTH | Age: 79
End: 2024-09-30
Payer: MEDICAID

## 2024-09-30 VITALS
HEART RATE: 88 BPM | SYSTOLIC BLOOD PRESSURE: 116 MMHG | TEMPERATURE: 98.9 F | RESPIRATION RATE: 15 BRPM | DIASTOLIC BLOOD PRESSURE: 78 MMHG

## 2024-09-30 PROCEDURE — G0151 HHCP-SERV OF PT,EA 15 MIN: HCPCS

## 2024-09-30 SDOH — HEALTH STABILITY: PHYSICAL HEALTH: EXERCISE COMMENTS: ANKLE DF/PF AND WALK EVERY HOUR AS TOLERATED.  USE WALKER FOR SAFE GAIT.

## 2024-09-30 ASSESSMENT — ENCOUNTER SYMPTOMS
PAIN LOCATION - PAIN SEVERITY: 9/10
PAIN LOCATION - PAIN FREQUENCY: CONSTANT
PAIN LOCATION - PAIN QUALITY: ACHE
LOWEST PAIN SEVERITY IN PAST 24 HOURS: 6/10
PAIN SEVERITY GOAL: 0/10
SUBJECTIVE PAIN PROGRESSION: WAXING AND WANING
HIGHEST PAIN SEVERITY IN PAST 24 HOURS: 10/10
MUSCLE WEAKNESS: 1
PERSON REPORTING PAIN: PATIENT
LIMITED RANGE OF MOTION: 1
PAIN LOCATION: RIGHT ANKLE
PAIN: 1

## 2024-09-30 ASSESSMENT — ACTIVITIES OF DAILY LIVING (ADL): AMBULATION ASSISTANCE ON FLAT SURFACES: 1

## 2024-10-03 ENCOUNTER — HOME CARE VISIT (OUTPATIENT)
Dept: HOME HEALTH SERVICES | Facility: HOME HEALTH | Age: 79
End: 2024-10-03
Payer: MEDICAID

## 2024-10-03 DIAGNOSIS — I25.10 CORONARY ARTERY DISEASE INVOLVING NATIVE CORONARY ARTERY OF NATIVE HEART WITHOUT ANGINA PECTORIS: ICD-10-CM

## 2024-10-03 PROCEDURE — G0152 HHCP-SERV OF OT,EA 15 MIN: HCPCS

## 2024-10-03 RX ORDER — NAPROXEN SODIUM 220 MG/1
TABLET, FILM COATED ORAL
Qty: 30 TABLET | Refills: 3 | Status: SHIPPED | OUTPATIENT
Start: 2024-10-03

## 2024-10-04 ENCOUNTER — HOME CARE VISIT (OUTPATIENT)
Dept: HOME HEALTH SERVICES | Facility: HOME HEALTH | Age: 79
End: 2024-10-04
Payer: MEDICAID

## 2024-10-04 PROCEDURE — G0157 HHC PT ASSISTANT EA 15: HCPCS | Mod: CQ

## 2024-10-04 ASSESSMENT — ENCOUNTER SYMPTOMS
PAIN LOCATION: RIGHT ANKLE
MUSCLE WEAKNESS: 1
LIMITED RANGE OF MOTION: 1
PAIN LOCATION: LEFT ANKLE
HIGHEST PAIN SEVERITY IN PAST 24 HOURS: 9/10
PAIN LOCATION - PAIN SEVERITY: 7/10
LOWEST PAIN SEVERITY IN PAST 24 HOURS: 6/10
PAIN LOCATION - PAIN SEVERITY: 9/10
PAIN SEVERITY GOAL: 0/10
SUBJECTIVE PAIN PROGRESSION: WAXING AND WANING
PERSON REPORTING PAIN: PATIENT
PAIN: 1

## 2024-10-04 ASSESSMENT — ACTIVITIES OF DAILY LIVING (ADL)
AMBULATION ASSISTANCE: STAND BY ASSIST
CURRENT_FUNCTION: STAND BY ASSIST
AMBULATION ASSISTANCE ON FLAT SURFACES: 1
AMBULATION_DISTANCE/DURATION_TOLERATED: 15 FT X2

## 2024-10-05 ASSESSMENT — ENCOUNTER SYMPTOMS
PAIN LOCATION - PAIN QUALITY: ACHE
PAIN LOCATION: RIGHT FOOT
PAIN LOCATION - PAIN SEVERITY: 8/10
PAIN LOCATION - PAIN FREQUENCY: CONSTANT
PAIN LOCATION - RELIEVING FACTORS: ICE, REST
PAIN LOCATION - PAIN DURATION: CONSTANT
PERSON REPORTING PAIN: PATIENT
PAIN LOCATION - EXACERBATING FACTORS: MOVEMENT

## 2024-10-07 ENCOUNTER — TELEPHONE (OUTPATIENT)
Dept: PRIMARY CARE | Facility: CLINIC | Age: 79
End: 2024-10-07
Payer: MEDICAID

## 2024-10-07 ENCOUNTER — HOME CARE VISIT (OUTPATIENT)
Dept: HOME HEALTH SERVICES | Facility: HOME HEALTH | Age: 79
End: 2024-10-07
Payer: MEDICAID

## 2024-10-07 PROCEDURE — G0157 HHC PT ASSISTANT EA 15: HCPCS | Mod: CQ

## 2024-10-07 SDOH — HEALTH STABILITY: PHYSICAL HEALTH: EXERCISE TYPE: B ANKLE AAROM

## 2024-10-07 SDOH — HEALTH STABILITY: PHYSICAL HEALTH
EXERCISE COMMENTS: PT COMPLETED BLE SEATED THER EX 1 X20 REPS AAROM OF B ANKLES W/ ICE AND ELEVATION APPLIED AFTER SESSION. PT DECLINED ANY OTHER SEATED THER EX.

## 2024-10-07 ASSESSMENT — ENCOUNTER SYMPTOMS
PAIN SEVERITY GOAL: 0/10
PAIN LOCATION - PAIN FREQUENCY: WITH ACTIVITY
MUSCLE WEAKNESS: 1
PAIN LOCATION - PAIN SEVERITY: 7/10
LIMITED RANGE OF MOTION: 1
HIGHEST PAIN SEVERITY IN PAST 24 HOURS: 9/10
PAIN LOCATION - PAIN QUALITY: INTENSE ACHE
PAIN LOCATION - RELIEVING FACTORS: ICE, MEDICATION
PERSON REPORTING PAIN: PATIENT
PAIN LOCATION - PAIN FREQUENCY: WITH ACTIVITY
PAIN LOCATION - PAIN QUALITY: ACHING
PAIN LOCATION: LEFT ANKLE
PAIN LOCATION - PAIN SEVERITY: 8/10
PAIN LOCATION - EXACERBATING FACTORS: WB
PAIN: 1
SUBJECTIVE PAIN PROGRESSION: WAXING AND WANING
LOWEST PAIN SEVERITY IN PAST 24 HOURS: 5/10

## 2024-10-07 ASSESSMENT — ACTIVITIES OF DAILY LIVING (ADL)
AMBULATION ASSISTANCE: ONE PERSON
AMBULATION ASSISTANCE: STAND BY ASSIST
CURRENT_FUNCTION: STAND BY ASSIST

## 2024-10-07 NOTE — TELEPHONE ENCOUNTER
Pt called requesting refills of acetaminophen 500mg. Noted pt not seen in this clinic and possible refills via order written May 20th 2024 with 11 refills.. Call placed to make pt aware. Pt made aware, confirms understanding. Contact info given to pt in case call needed to be made to primary for refill.

## 2024-10-08 ENCOUNTER — HOME CARE VISIT (OUTPATIENT)
Dept: HOME HEALTH SERVICES | Facility: HOME HEALTH | Age: 79
End: 2024-10-08
Payer: MEDICAID

## 2024-10-08 PROCEDURE — G0152 HHCP-SERV OF OT,EA 15 MIN: HCPCS

## 2024-10-09 ENCOUNTER — HOME CARE VISIT (OUTPATIENT)
Dept: HOME HEALTH SERVICES | Facility: HOME HEALTH | Age: 79
End: 2024-10-09
Payer: MEDICAID

## 2024-10-09 PROCEDURE — G0157 HHC PT ASSISTANT EA 15: HCPCS | Mod: CQ

## 2024-10-09 SDOH — HEALTH STABILITY: PHYSICAL HEALTH
EXERCISE COMMENTS: R ANKLE GENTLE DF/PF   L ANDKLE DF/PF   L ANKLE EVERSION/INVERSION  LAQS  MARCHES  CHAIR PUSH-UPS W/ LLE ASSIST

## 2024-10-09 SDOH — HEALTH STABILITY: PHYSICAL HEALTH: EXERCISE TYPE: BLE SEATED

## 2024-10-09 ASSESSMENT — ENCOUNTER SYMPTOMS
LIMITED RANGE OF MOTION: 1
PAIN LOCATION - PAIN FREQUENCY: WITH ACTIVITY
PAIN LOCATION - PAIN QUALITY: ACHE
SUBJECTIVE PAIN PROGRESSION: WAXING AND WANING
PAIN LOCATION - PAIN QUALITY: SHARP
PERSON REPORTING PAIN: PATIENT
PAIN LOCATION - PAIN QUALITY: ACHING
PAIN SEVERITY GOAL: 0/10
PAIN LOCATION - PAIN SEVERITY: 8/10
PAIN LOCATION - PAIN SEVERITY: 5/10
PAIN LOCATION - PAIN FREQUENCY: CONSTANT
PAIN LOCATION - RELIEVING FACTORS: REST, ELEVATION
PAIN: 1
PAIN LOCATION: RIGHT ANKLE
PAIN LOCATION - PAIN SEVERITY: 7/10
PAIN LOCATION - PAIN FREQUENCY: CONSTANT
PERSON REPORTING PAIN: PATIENT
PAIN LOCATION: LEFT ANKLE
PAIN: 1
PAIN LOCATION - PAIN FREQUENCY: WITH ACTIVITY
PAIN LOCATION - PAIN QUALITY: ACHE
PAIN LOCATION: RIGHT ANKLE
HIGHEST PAIN SEVERITY IN PAST 24 HOURS: 9/10
PAIN LOCATION - RELIEVING FACTORS: REST, ELEVATION
PAIN LOCATION: LEFT FOOT
LOWEST PAIN SEVERITY IN PAST 24 HOURS: 4/10
PAIN LOCATION - PAIN QUALITY: ACHE
PAIN LOCATION: LEFT KNEE
PAIN LOCATION - PAIN DURATION: CONSTANT
PAIN LOCATION - PAIN SEVERITY: 7/10
PAIN LOCATION - PAIN FREQUENCY: WITH ACTIVITY
PAIN LOCATION - PAIN SEVERITY: 7/10
MUSCLE WEAKNESS: 1
PAIN LOCATION: LEFT KNEE
PAIN LOCATION - PAIN SEVERITY: 7/10
PAIN LOCATION - PAIN DURATION: CONSTANT
PAIN LOCATION - RELIEVING FACTORS: REST, ELEVATION
PAIN LOCATION - PAIN FREQUENCY: CONSTANT
PAIN LOCATION - PAIN DURATION: CONSTANT

## 2024-10-09 ASSESSMENT — ACTIVITIES OF DAILY LIVING (ADL)
CURRENT_FUNCTION: STAND BY ASSIST
AMBULATION ASSISTANCE: STAND BY ASSIST

## 2024-10-10 ENCOUNTER — HOME CARE VISIT (OUTPATIENT)
Dept: HOME HEALTH SERVICES | Facility: HOME HEALTH | Age: 79
End: 2024-10-10
Payer: MEDICAID

## 2024-10-10 PROCEDURE — G0152 HHCP-SERV OF OT,EA 15 MIN: HCPCS

## 2024-10-13 ASSESSMENT — ENCOUNTER SYMPTOMS
PAIN LOCATION: RIGHT FOOT
PAIN: 1
PAIN LOCATION - EXACERBATING FACTORS: INCREASED MOVEMENT
PAIN LOCATION - PAIN QUALITY: ACHE
PERSON REPORTING PAIN: PATIENT
PAIN LOCATION - PAIN DURATION: CONSTANT
PAIN LOCATION - RELIEVING FACTORS: REST
PAIN LOCATION - PAIN SEVERITY: 5/10
PAIN LOCATION - PAIN FREQUENCY: CONSTANT

## 2024-10-14 ENCOUNTER — HOME CARE VISIT (OUTPATIENT)
Dept: HOME HEALTH SERVICES | Facility: HOME HEALTH | Age: 79
End: 2024-10-14
Payer: MEDICAID

## 2024-10-14 VITALS — HEART RATE: 102 BPM | SYSTOLIC BLOOD PRESSURE: 105 MMHG | OXYGEN SATURATION: 99 % | DIASTOLIC BLOOD PRESSURE: 55 MMHG

## 2024-10-14 PROCEDURE — G0157 HHC PT ASSISTANT EA 15: HCPCS | Mod: CQ

## 2024-10-14 ASSESSMENT — ACTIVITIES OF DAILY LIVING (ADL)
AMBULATION ASSISTANCE: STAND BY ASSIST
CURRENT_FUNCTION: STAND BY ASSIST

## 2024-10-14 ASSESSMENT — ENCOUNTER SYMPTOMS
LOWEST PAIN SEVERITY IN PAST 24 HOURS: 3/10
PAIN SEVERITY GOAL: 0/10
MUSCLE WEAKNESS: 1
PAIN LOCATION: LEFT ANKLE
PAIN LOCATION - PAIN SEVERITY: 5/10
PERSON REPORTING PAIN: PATIENT
PAIN: 1
HIGHEST PAIN SEVERITY IN PAST 24 HOURS: 6/10
PAIN LOCATION: RIGHT ANKLE
SUBJECTIVE PAIN PROGRESSION: GRADUALLY IMPROVING
LIMITED RANGE OF MOTION: 1
PAIN LOCATION - PAIN SEVERITY: 5/10

## 2024-10-15 ENCOUNTER — HOME CARE VISIT (OUTPATIENT)
Dept: HOME HEALTH SERVICES | Facility: HOME HEALTH | Age: 79
End: 2024-10-15
Payer: MEDICAID

## 2024-10-15 PROCEDURE — G0152 HHCP-SERV OF OT,EA 15 MIN: HCPCS

## 2024-10-15 ASSESSMENT — ENCOUNTER SYMPTOMS
PAIN LOCATION - PAIN DURATION: INTERMITTENT
PAIN: 1
PERSON REPORTING PAIN: PATIENT
PAIN LOCATION - PAIN FREQUENCY: INTERMITTENT
PAIN LOCATION - PAIN SEVERITY: 5/10
PAIN LOCATION - PAIN QUALITY: ACHE
PAIN LOCATION: LEFT FOOT

## 2024-10-16 ENCOUNTER — HOME CARE VISIT (OUTPATIENT)
Dept: HOME HEALTH SERVICES | Facility: HOME HEALTH | Age: 79
End: 2024-10-16
Payer: MEDICAID

## 2024-10-16 VITALS — SYSTOLIC BLOOD PRESSURE: 140 MMHG | DIASTOLIC BLOOD PRESSURE: 75 MMHG | HEART RATE: 69 BPM | OXYGEN SATURATION: 99 %

## 2024-10-16 DIAGNOSIS — I50.20 HFREF (HEART FAILURE WITH REDUCED EJECTION FRACTION): Primary | ICD-10-CM

## 2024-10-16 PROCEDURE — G0157 HHC PT ASSISTANT EA 15: HCPCS | Mod: CQ

## 2024-10-16 SDOH — HEALTH STABILITY: PHYSICAL HEALTH: EXERCISE TYPE: SEATED

## 2024-10-16 ASSESSMENT — ENCOUNTER SYMPTOMS
PAIN LOCATION: RIGHT ANKLE
HIGHEST PAIN SEVERITY IN PAST 24 HOURS: 6/10
LOWEST PAIN SEVERITY IN PAST 24 HOURS: 3/10
PAIN LOCATION: LEFT ANKLE
MUSCLE WEAKNESS: 1
PAIN SEVERITY GOAL: 0/10
SUBJECTIVE PAIN PROGRESSION: GRADUALLY IMPROVING
PAIN: 1
PERSON REPORTING PAIN: PATIENT
LIMITED RANGE OF MOTION: 1
PAIN LOCATION - PAIN SEVERITY: 5/10
PAIN LOCATION - PAIN SEVERITY: 5/10

## 2024-10-16 ASSESSMENT — ACTIVITIES OF DAILY LIVING (ADL)
AMBULATION ASSISTANCE: STAND BY ASSIST
CURRENT_FUNCTION: STAND BY ASSIST
AMBULATION_DISTANCE/DURATION_TOLERATED: 35 FT X2
AMBULATION ASSISTANCE ON FLAT SURFACES: 1

## 2024-10-17 ENCOUNTER — HOME CARE VISIT (OUTPATIENT)
Dept: HOME HEALTH SERVICES | Facility: HOME HEALTH | Age: 79
End: 2024-10-17
Payer: MEDICAID

## 2024-10-17 PROCEDURE — G0152 HHCP-SERV OF OT,EA 15 MIN: HCPCS

## 2024-10-19 ASSESSMENT — ENCOUNTER SYMPTOMS
DENIES PAIN: 1
PERSON REPORTING PAIN: PATIENT

## 2024-10-21 ENCOUNTER — HOME CARE VISIT (OUTPATIENT)
Dept: HOME HEALTH SERVICES | Facility: HOME HEALTH | Age: 79
End: 2024-10-21
Payer: MEDICAID

## 2024-10-21 VITALS — SYSTOLIC BLOOD PRESSURE: 125 MMHG | DIASTOLIC BLOOD PRESSURE: 80 MMHG

## 2024-10-21 PROCEDURE — G0157 HHC PT ASSISTANT EA 15: HCPCS | Mod: CQ

## 2024-10-21 SDOH — HEALTH STABILITY: PHYSICAL HEALTH: EXERCISE COMMENTS: PT COMPLETED BLE STANDING: 1 X15 REPS  MARCHES  HIP ABD  HIP EXT

## 2024-10-21 SDOH — HEALTH STABILITY: PHYSICAL HEALTH: EXERCISE TYPE: BLE STANDING

## 2024-10-21 ASSESSMENT — ENCOUNTER SYMPTOMS
PAIN LOCATION - PAIN SEVERITY: 3/10
PERSON REPORTING PAIN: PATIENT
HIGHEST PAIN SEVERITY IN PAST 24 HOURS: 5/10
LOWEST PAIN SEVERITY IN PAST 24 HOURS: 2/10
SUBJECTIVE PAIN PROGRESSION: GRADUALLY IMPROVING
PAIN LOCATION - PAIN QUALITY: ACHING
PAIN: 1
MUSCLE WEAKNESS: 1
PAIN SEVERITY GOAL: 0/10

## 2024-10-21 ASSESSMENT — ACTIVITIES OF DAILY LIVING (ADL)
AMBULATION ASSISTANCE ON FLAT SURFACES: 1
AMBULATION_DISTANCE/DURATION_TOLERATED: 45 FT
CURRENT_FUNCTION: STAND BY ASSIST
AMBULATION ASSISTANCE: STAND BY ASSIST

## 2024-10-22 ENCOUNTER — HOME CARE VISIT (OUTPATIENT)
Dept: HOME HEALTH SERVICES | Facility: HOME HEALTH | Age: 79
End: 2024-10-22
Payer: MEDICAID

## 2024-10-22 PROCEDURE — G0152 HHCP-SERV OF OT,EA 15 MIN: HCPCS

## 2024-10-23 ENCOUNTER — DOCUMENTATION (OUTPATIENT)
Dept: PRIMARY CARE | Facility: HOSPITAL | Age: 79
End: 2024-10-23
Payer: MEDICAID

## 2024-10-23 ENCOUNTER — APPOINTMENT (OUTPATIENT)
Dept: PRIMARY CARE | Facility: HOSPITAL | Age: 79
End: 2024-10-23
Payer: MEDICAID

## 2024-10-23 ENCOUNTER — OFFICE VISIT (OUTPATIENT)
Dept: PRIMARY CARE | Facility: HOSPITAL | Age: 79
End: 2024-10-23
Payer: MEDICAID

## 2024-10-23 VITALS
BODY MASS INDEX: 29.68 KG/M2 | OXYGEN SATURATION: 97 % | WEIGHT: 212 LBS | SYSTOLIC BLOOD PRESSURE: 114 MMHG | TEMPERATURE: 97.2 F | HEIGHT: 71 IN | HEART RATE: 94 BPM | DIASTOLIC BLOOD PRESSURE: 76 MMHG

## 2024-10-23 VITALS
SYSTOLIC BLOOD PRESSURE: 120 MMHG | TEMPERATURE: 97.9 F | DIASTOLIC BLOOD PRESSURE: 82 MMHG | OXYGEN SATURATION: 96 % | HEART RATE: 103 BPM

## 2024-10-23 DIAGNOSIS — I50.23 ACUTE ON CHRONIC SYSTOLIC HEART FAILURE: ICD-10-CM

## 2024-10-23 DIAGNOSIS — Z13.9 SCREENING DUE: ICD-10-CM

## 2024-10-23 DIAGNOSIS — M25.562 CHRONIC PAIN OF BOTH KNEES: ICD-10-CM

## 2024-10-23 DIAGNOSIS — I25.10 CORONARY ARTERY DISEASE INVOLVING NATIVE CORONARY ARTERY OF NATIVE HEART WITHOUT ANGINA PECTORIS: ICD-10-CM

## 2024-10-23 DIAGNOSIS — M25.561 CHRONIC PAIN OF BOTH KNEES: ICD-10-CM

## 2024-10-23 DIAGNOSIS — G89.29 CHRONIC PAIN OF BOTH KNEES: ICD-10-CM

## 2024-10-23 LAB
ALBUMIN SERPL BCP-MCNC: 3.7 G/DL (ref 3.4–5)
ALP SERPL-CCNC: 75 U/L (ref 33–136)
ALT SERPL W P-5'-P-CCNC: 12 U/L (ref 10–52)
ANION GAP SERPL CALC-SCNC: 17 MMOL/L (ref 10–20)
AST SERPL W P-5'-P-CCNC: 15 U/L (ref 9–39)
BASOPHILS # BLD AUTO: 0.05 X10*3/UL (ref 0–0.1)
BASOPHILS NFR BLD AUTO: 0.9 %
BILIRUB SERPL-MCNC: 0.8 MG/DL (ref 0–1.2)
BUN SERPL-MCNC: 23 MG/DL (ref 6–23)
CALCIUM SERPL-MCNC: 9 MG/DL (ref 8.6–10.6)
CHLORIDE SERPL-SCNC: 105 MMOL/L (ref 98–107)
CHOLEST SERPL-MCNC: 132 MG/DL (ref 0–199)
CHOLESTEROL/HDL RATIO: 3.8
CO2 SERPL-SCNC: 23 MMOL/L (ref 21–32)
CREAT SERPL-MCNC: 1.83 MG/DL (ref 0.5–1.3)
EGFRCR SERPLBLD CKD-EPI 2021: 37 ML/MIN/1.73M*2
EOSINOPHIL # BLD AUTO: 0.26 X10*3/UL (ref 0–0.4)
EOSINOPHIL NFR BLD AUTO: 4.5 %
ERYTHROCYTE [DISTWIDTH] IN BLOOD BY AUTOMATED COUNT: 14.4 % (ref 11.5–14.5)
EST. AVERAGE GLUCOSE BLD GHB EST-MCNC: 91 MG/DL
GLUCOSE SERPL-MCNC: 140 MG/DL (ref 74–99)
HBA1C MFR BLD: 4.8 %
HCT VFR BLD AUTO: 32 % (ref 41–52)
HDLC SERPL-MCNC: 35 MG/DL
HGB BLD-MCNC: 9.5 G/DL (ref 13.5–17.5)
IMM GRANULOCYTES # BLD AUTO: 0.05 X10*3/UL (ref 0–0.5)
IMM GRANULOCYTES NFR BLD AUTO: 0.9 % (ref 0–0.9)
LDLC SERPL CALC-MCNC: 71 MG/DL
LYMPHOCYTES # BLD AUTO: 0.95 X10*3/UL (ref 0.8–3)
LYMPHOCYTES NFR BLD AUTO: 16.4 %
MCH RBC QN AUTO: 28.6 PG (ref 26–34)
MCHC RBC AUTO-ENTMCNC: 29.7 G/DL (ref 32–36)
MCV RBC AUTO: 96 FL (ref 80–100)
MONOCYTES # BLD AUTO: 0.45 X10*3/UL (ref 0.05–0.8)
MONOCYTES NFR BLD AUTO: 7.7 %
NEUTROPHILS # BLD AUTO: 4.05 X10*3/UL (ref 1.6–5.5)
NEUTROPHILS NFR BLD AUTO: 69.6 %
NON HDL CHOLESTEROL: 97 MG/DL (ref 0–149)
NRBC BLD-RTO: 0 /100 WBCS (ref 0–0)
PLATELET # BLD AUTO: 258 X10*3/UL (ref 150–450)
POTASSIUM SERPL-SCNC: 4.6 MMOL/L (ref 3.5–5.3)
PROT SERPL-MCNC: 7.5 G/DL (ref 6.4–8.2)
RBC # BLD AUTO: 3.32 X10*6/UL (ref 4.5–5.9)
SODIUM SERPL-SCNC: 140 MMOL/L (ref 136–145)
TRIGL SERPL-MCNC: 128 MG/DL (ref 0–149)
VLDL: 26 MG/DL (ref 0–40)
WBC # BLD AUTO: 5.8 X10*3/UL (ref 4.4–11.3)

## 2024-10-23 PROCEDURE — 80053 COMPREHEN METABOLIC PANEL: CPT

## 2024-10-23 PROCEDURE — 83036 HEMOGLOBIN GLYCOSYLATED A1C: CPT

## 2024-10-23 PROCEDURE — 80061 LIPID PANEL: CPT

## 2024-10-23 PROCEDURE — 36415 COLL VENOUS BLD VENIPUNCTURE: CPT

## 2024-10-23 PROCEDURE — 85025 COMPLETE CBC W/AUTO DIFF WBC: CPT

## 2024-10-23 RX ORDER — NAPROXEN SODIUM 220 MG/1
81 TABLET, FILM COATED ORAL DAILY
Qty: 30 TABLET | Refills: 3 | Status: SHIPPED | OUTPATIENT
Start: 2024-10-23

## 2024-10-23 RX ORDER — LOSARTAN POTASSIUM 50 MG/1
50 TABLET ORAL DAILY
Start: 2024-10-23 | End: 2025-10-23

## 2024-10-23 RX ORDER — ACETAMINOPHEN 500 MG
500 TABLET ORAL EVERY 4 HOURS PRN
Qty: 30 TABLET | Refills: 11 | Status: SHIPPED | OUTPATIENT
Start: 2024-10-23

## 2024-10-23 ASSESSMENT — ENCOUNTER SYMPTOMS
PERSON REPORTING PAIN: PATIENT
LOSS OF SENSATION IN FEET: 0
OCCASIONAL FEELINGS OF UNSTEADINESS: 0
DENIES PAIN: 1
DEPRESSION: 0

## 2024-10-23 ASSESSMENT — PATIENT HEALTH QUESTIONNAIRE - PHQ9
SUM OF ALL RESPONSES TO PHQ9 QUESTIONS 1 AND 2: 0
2. FEELING DOWN, DEPRESSED OR HOPELESS: NOT AT ALL
1. LITTLE INTEREST OR PLEASURE IN DOING THINGS: NOT AT ALL

## 2024-10-23 NOTE — PATIENT INSTRUCTIONS
As discussed today, our plan is:     Labs - you had blood work done today and we will call you with any abnormal results  Please continue to follow up with your cardiologist Dr. Story for your heart failure (your next appointment is February 27th)  We have re-ordered your lift chair and provided you with a paper prescription and will send the order to your insurance company for approval    Please come back to see me in: 3 months  ------  If you have any problems or questions, please contact the clinic at [phone] to leave a message. Our fax number is [phone]. If your issue cannot wait until the next business day, please go to urgent care or the emergency department.     I also strongly urge all of my patients to register for [facility] by going to: [email or web address]  (The  staff can also send you a text/email link to register when you check out).    No shows: It is understandable if you are unable to make it to a visit, but please cancel your appointment instead of not showing up. This helps to give other patients access to primary care and keeps wait times low.      Dr. Jun Garrett

## 2024-10-23 NOTE — PROGRESS NOTES
Chief complaint:    HPI:  Bereket Ogden is a 79 y.o. 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      I last saw Mr. Og[place] [facility] [place] for FUV where he said his OA has gotten worse and he requested a 'lift chair' to get around. Home PT/OT was ordered to assess for need for DME at home, and chair riser and toilet riser were ordered. He also had repeat labs ordered for him to get before his next appointment. Patient also states he does not think he needs a repeat TTE but per Dr. Story' note one was request so he states he will call Dr. Story' office to confirm.    Since then he visited the ED 9/25/24 for R leg pain after a fall. He had Xrs that were negative and was treated with NSAIDs/ice and discharged in stable condition.     Today, patient states his ankle pain was so bad he wasn't able to walk on it. He states he takes tylenol and aspirin every 4 hours. He states it has improved now to the point where he is able to walk but it still hurts. Says he has not had SOB or chest pain. Denies any recent illness. Still refuses any further medical management of his OA. Otherwise has no complaints.     Medications:    Current Outpatient Medications:     acetaminophen (Tylenol) 500 mg tablet, Take 1 tablet (500 mg) by mouth every 4 hours if needed for mild pain (1 - 3)., Disp: 30 tablet, Rfl: 11    aspirin 81 mg chewable tablet, CHEW ONE (1) TABLET (81 MG) ONCE DAILY., Disp: 30 tablet, Rfl: 3    atorvastatin (Lipitor) 40 mg tablet, Take 1 tablet (40 mg) by mouth once daily at bedtime., Disp: 30 tablet, Rfl: 3    carvedilol (Coreg) 6.25 mg tablet, Take 1  "tablet (6.25 mg) by mouth 2 times daily (morning and late afternoon)., Disp: 60 tablet, Rfl: 11    diclofenac sodium (Voltaren) 1 % gel, Apply 4.5 inches (4 g) topically 4 times a day. (Patient taking differently: Apply 4.5 inches topically 4 times a day. applies to knees, elbows, hands  Indications: pain), Disp: 100 g, Rfl: 1    losartan (Cozaar) 50 mg tablet, Take 0.5 tablets (25 mg) by mouth once daily. (Patient taking differently: Take 1 tablet (50 mg) by mouth once daily.), Disp: 15 tablet, Rfl: 11    miscellaneous medical supply misc, Lift chair at home, Disp: 1 each, Rfl: 0    oxygen (O2) gas therapy, Inhale 2 L/min if needed (COPD). Indications: Shortness of breath, Disp: , Rfl:     Spiriva Respimat 2.5 mcg/actuation inhaler, Inhale 2 puffs once daily., Disp: 30 g, Rfl: 11    tamsulosin (Flomax) 0.4 mg 24 hr capsule, Take 1 capsule (0.4 mg) by mouth once daily., Disp: 30 capsule, Rfl: 11    torsemide (Demadex) 20 mg tablet, Take 2 tablets (40 mg) by mouth once daily., Disp: 180 tablet, Rfl: 1    Ventolin HFA 90 mcg/actuation inhaler, Inhale 2 puffs every 4 hours if needed for wheezing., Disp: 18 g, Rfl: 2    Allergies:  Allergies   Allergen Reactions    Penicillins Anaphylaxis       Vitals:  /76   Pulse 94   Temp 36.2 °C (97.2 °F) (Temporal)   Ht 1.803 m (5' 11\")   Wt 96.2 kg (212 lb)   SpO2 97%   BMI 29.57 kg/m²       Physical exam:  Constitutional: Elderly AA male in no acute distress.  HEENT: Normocephalic, atraumatic. PERRL. EOMI. No cervical lymphadenopathy.  Respiratory: CTA bilaterally except for some minor crackles at L base. Normal respiratory effort.  Cardiovascular: RRR. No murmurs, gallops, or rubs. No JVD. Radial pulses 2+.  Abdominal: Soft, nondistended, nontender to palpation. Bowel sounds present. No hepatosplenomegaly or masses. No CVA tenderness.  Neuro: CN II-XII intact. UE and LE strength 5/5 bilaterally and sensation intact. Normal FTN testing.  MSK: Trace LE edema " bilaterally, mild TTP in knees and ankles b/l  Skin: Warm, dry. No rashes or wounds.  Psych: Appropriate mood and affect.    Labs:  No results found for this or any previous visit (from the past 24 hours).    Imaging:  XR foot right 3+ views    Result Date: 9/25/2024  Interpreted By:  Jorgtio Love and Gupta Jayesh STUDY: XR FOOT RIGHT 3+ VIEWS; XR ANKLE RIGHT 3+ VIEWS; XR TIBIA FIBULA RIGHT 2 VIEWS; ;  9/25/2024 9:53 am   INDICATION: Signs/Symptoms:trauma.   COMPARISON: Comparison right knee x-ray from 12/14/2010.   ACCESSION NUMBER(S): [id number]; [id number]; [id number]   ORDERING CLINICIAN: YING BURGOS   FINDINGS: Two views of the right tibia fibula. Three views of the right ankle. Three views of the right foot.   Right tibia/fibula: Proximal right tibia hardware is intact. No periprosthetic fractures or lucencies. Bicompartment osteoarthritis of the tibiofemoral space. No other acute fractures or dislocation.   Right ankle: No acute fractures or dislocation. Tibiotalar joint space is maintained. Syndesmosis is intact. No radiopaque foreign body. Lateral malleolar soft tissue swelling.   Right foot: Marginal osteophytes consistent with osteoarthritis. Calcaneal heel spurs present. Generalized soft tissue swelling of the right foot. No radiopaque foreign body. Moderate midfoot arthrosis.       No acute fractures of the right tibia/fibula, right ankle, or right foot.   Lateral malleolar soft tissue swelling.   I personally reviewed the images/study and I agree with the findings as stated by Shon Ferrer MD. This study was interpreted at [facility], [place], [place].   MACRO: None   Signed by: Jorgito Love 9/25/2024 10:25 AM Dictation workstation:   [id number]    XR ankle right 3+ views    Result Date: 9/25/2024  Interpreted By:  Jorgito Love and Gupta Jayesh STUDY: XR FOOT RIGHT 3+ VIEWS; XR ANKLE RIGHT 3+ VIEWS; XR TIBIA FIBULA RIGHT 2 VIEWS; ;  9/25/2024 9:53 am    INDICATION: Signs/Symptoms:trauma.   COMPARISON: Comparison right knee x-ray from 12/14/2010.   ACCESSION NUMBER(S): [id number]; [id number]; [id number]   ORDERING CLINICIAN: YING BURGOS   FINDINGS: Two views of the right tibia fibula. Three views of the right ankle. Three views of the right foot.   Right tibia/fibula: Proximal right tibia hardware is intact. No periprosthetic fractures or lucencies. Bicompartment osteoarthritis of the tibiofemoral space. No other acute fractures or dislocation.   Right ankle: No acute fractures or dislocation. Tibiotalar joint space is maintained. Syndesmosis is intact. No radiopaque foreign body. Lateral malleolar soft tissue swelling.   Right foot: Marginal osteophytes consistent with osteoarthritis. Calcaneal heel spurs present. Generalized soft tissue swelling of the right foot. No radiopaque foreign body. Moderate midfoot arthrosis.       No acute fractures of the right tibia/fibula, right ankle, or right foot.   Lateral malleolar soft tissue swelling.   I personally reviewed the images/study and I agree with the findings as stated by Shon Ferrer MD. This study was interpreted at [facility], [place], [place].   MACRO: None   Signed by: Jorgito Love 9/25/2024 10:25 AM Dictation workstation:   [id number]    XR tibia fibula right 2 views    Result Date: 9/25/2024  Interpreted By:  Jorgito Love and Gupta Jayesh STUDY: XR FOOT RIGHT 3+ VIEWS; XR ANKLE RIGHT 3+ VIEWS; XR TIBIA FIBULA RIGHT 2 VIEWS; ;  9/25/2024 9:53 am   INDICATION: Signs/Symptoms:trauma.   COMPARISON: Comparison right knee x-ray from 12/14/2010.   ACCESSION NUMBER(S): [id number]; [id number]; [id number]   ORDERING CLINICIAN: YING BURGOS   FINDINGS: Two views of the right tibia fibula. Three views of the right ankle. Three views of the right foot.   Right tibia/fibula: Proximal right tibia hardware is intact. No periprosthetic fractures or lucencies.  Bicompartment osteoarthritis of the tibiofemoral space. No other acute fractures or dislocation.   Right ankle: No acute fractures or dislocation. Tibiotalar joint space is maintained. Syndesmosis is intact. No radiopaque foreign body. Lateral malleolar soft tissue swelling.   Right foot: Marginal osteophytes consistent with osteoarthritis. Calcaneal heel spurs present. Generalized soft tissue swelling of the right foot. No radiopaque foreign body. Moderate midfoot arthrosis.       No acute fractures of the right tibia/fibula, right ankle, or right foot.   Lateral malleolar soft tissue swelling.   I personally reviewed the images/study and I agree with the findings as stated by Shon Ferrer MD. This study was interpreted at [facility], [place], [place].   MACRO: None   Signed by: Jorgito Love 9/25/2024 10:25 AM Dictation workstation:   [id number]      Assessment and plan:  Bereket Ogden is a 79 y.o. male with PMHx of Stage C systolic HF/NICM/HFrEF (TTE 3/23 EF 25-30%), 2:1 AV block s/p ICD, Aflutter s/p CTI RFA (2018)HTN, nonobstructive CAD, ascending aortic dilatation (4.1 cm on CT 03/2023), h/o RLE DVT (setting of COVID; 12/2021), BPH, and CKD (baseline Scr ~1.5), previous respiratory failure requiring oxygen supplementation (setting of COVID PNA; 12/2022) [place] [facility] [place].     Updates 10/23/24:   -Collected labs today, will follow up results  -Weight 212lb today, around his dry weight, continue with current GDMT regimen  -Patient states he is taking tylenol 500 q4hrs and was taking aspirin 81 q4 hours as well but was told he is only supposed to take this once a day, which was re-iterated to him today  -He is not interested in further medical management of OA  -Face to face for lift chair today, re-ordered and will submit to insurance    #Face to face for lift chair  ::Patient has severe hip, knee, and ankle OA that makes it difficult to get up from seated  position  ::Is still have severe symptoms on medical therapy (max dose tylenol) and is limited in pain medications by kidney function as well as his age  ::When he is standing he is able to walk with a cane  ::Not being able to stand quickly is affecting his ability to live independently  ::Also the diuretics he is on are making him pee frequently and it is hard for him to stand to urinate  -DME ordered, to be submitted to his insurance     #AHRF  #Stage C systolic HF/NICM/HFrEF (TTE 3/23 EF 25-30%)   #2:1 AV block s/p ICD  #Aflutter s/p CTI RFA (2018)  #HTN  -Instructed on dietary discretion in terms of salt and daily weight check   -Pt refused lab work today, will attempt to check RFP next visit    -Refilled home oxygen   -Current GDMT regimen - Losartan 50, Coreg 6.125, torsemide 40 mg  -Followed by HF (Dr Story), next appt 02/27/25     #CAD, nonobstructive   :: Cath 10/2020 w/ mild RCA stenosis 40-50%   -c/w ASA 81 mg daily, atorvastatin 40 mg daily, beta blocker      #Ascending aortic dilatation (4.1 cm on CT 03/2023)  :: Stable on most recent CT. Seen by CTS and recommended no addition follow up  -Repeat screening 03/2025     #BPH  -C/w tamsulosin 0.4      #CKD (baseline Scr ~1.5)  :: Likely in the setting of HTN. HA1C 5.7% 2021.   - Labs today     #COPD  -Chart diagnosis of COPD, however PFTs from 10/2020 w/ no airway obstruction  - C/w albuterol prn, Spiriva 2 puffs daily     # Health maintenance  - last lipid panel (2021) HDL 37.9, LDL 59, tchol 125, trig 139  - Last HbA1c: 5.7 (2021)  - Vaccinations: Pt refused Pneumovax and shingles vaccine, states he got them . Refuses flu shot because he states he made it sick to his stomach last time  - Colonoscopy: 2015 negative, repeat in 5 years however patient is 79    Follow-up in 3 months.    Patient and plan discussed with attending physician, Dr. Glez.    Jun Garrett MD  PGY-2 Internal Medicine  [facility]

## 2024-10-23 NOTE — PROGRESS NOTES
Advanced care planning discussed at this visit. Patient has a Healthcare Power of  and Living Willing but it is currently not on file. He expressed that his daughter Tolu Law has his permission to act as his surrogate decision maker in the event of an emergency. Patient advised to bring in POA, Living Will and Advanced Care Plan for his chart at next visit.     [facility]  Discharge- Russ Diaz 1980, 44 y.o. male MRN: [id number]  Unit/Bed#: PPHP 830-01 Encounter: [id number]  Primary Care Provider: No primary care provider on file.   Date and time admitted to hospital: 5/4/2024 10:30 PM    Leg laceration, right, initial encounter  Assessment & Plan  - Right anterior thigh laceration, 3 cm, repaired with sutures at outside facility  -Associated hematoma beneath  -Local wound care with soap and water daily  -Clindamycin for 5 days to avoid infection, high risk  -Tdap updated  -Go to [facility] emergency department where sutures were placed due to distance from [facility] trauma clinic    * Hematoma of leg, right, initial encounter  Assessment & Plan  - Small hematoma R thigh associated with penetrating injury  - Golf ball sized hematoma, minimally tender  -Hemoglobin and vitals stable  -Neurovascularly intact  -Ambulating independently  -Pain tolerated  -DVT prophylaxis while inpatient  -Continue compressive dressing, elevate extremity, apply ice  -Discharge home  -Follow-up with primary care provider due to distance from trauma center        TRAUMA TERTIARY SURVEY NOTE    VTE Prophylaxis:Enoxaparin (Lovenox)     Disposition: Medically stable for discharge home    Code status:  Level 1 - Full Code    Consultants: None    Subjective   Transfer from: [facility]    Mechanism of Injury:Fall     Chief Complaint: Right leg pain    HPI/Last 24 hour events: Reports that the right thigh is in pain and that he was able to ambulate to the bathroom but he was limping.  He denies any headaches, dizziness, photophobia, lightheadedness, chest pain, shortness of breath, abdominal pain.  He reports minimal numbness on the medial part of his thigh, otherwise denies any numbness or tingling.     Objective   Vitals:   Temp:  [97.8 °F (36.6 °C)-98.5 °F (36.9 °C)] 98.5 °F (36.9 °C)  HR:  [49-74] 74  Resp:  [13-22] 16  BP:  (107-144)/(55-77) 116/58    I/O         05/03 0701  05/04 0700 05/04 0701  05/05 0700 05/05 0701  05/06 0700    P.O.   660    Total Intake(mL/kg)   660 (9.4)    Urine (mL/kg/hr)  550     Total Output  550     Net  -550 +660                    Physical Exam:   GENERAL APPEARANCE: Patient in no acute distress.  HEENT: NCAT; PERRL, EOMs intact; Mucous membranes moist  CV: Regular rate and rhythm; no murmur/gallops/rubs appreciated.  CHEST / LUNGS: Clear to auscultation; no wheezes/rales/rhonci.  ABD: NABS; soft; non-distended; non-tender.  : Voiding  EXT: +2 pulses bilaterally upper & lower extremities; no edema.  NEURO: GCS 15; no focal neurologic deficits; neurovascularly intact.  SKIN: Right anterior thigh laceration about 3 cm repaired with sutures, minimal serosanguinous drainage with underlying small hematoma, tender      Invasive Devices       Peripheral Intravenous Line  Duration             Peripheral IV 05/04/24 Right Antecubital <1 day                            Lab Results: Results: I have personally reviewed all pertinent laboratory/tests results, BMP/CMP:   Lab Results   Component Value Date    SODIUM 136 05/04/2024    K 3.7 05/04/2024     05/04/2024    CO2 24 05/04/2024    BUN 12 05/04/2024    CREATININE 0.92 05/04/2024    CALCIUM 8.7 05/04/2024    AST 15 05/04/2024    ALT 10 05/04/2024    ALKPHOS 74 05/04/2024    EGFR 99 05/04/2024   , and CBC:   Lab Results   Component Value Date    WBC 8.37 05/05/2024    HGB 12.5 05/05/2024    HCT 39.3 05/05/2024    MCV 96 05/05/2024     05/05/2024    RBC 4.09 05/05/2024    MCH 30.6 05/05/2024    MCHC 31.8 05/05/2024    RDW 13.2 05/05/2024    MPV 10.0 05/05/2024    NRBC 0 05/04/2024       Imaging Results: I have personally reviewed pertinent reports.    Chest Xray(s): N/A   FAST exam(s): N/A   CT Scan(s): positive for acute findings: R thigh hematoma   Additional Xray(s): N/A     Other Studies: none         Medical Problems       Resolved Problems  Date  "Reviewed: 5/5/2024   None         Admission Date:   Admission Orders (From admission, onward)       Ordered        05/04/24 2253  Place in Observation  Once                            Admitting Diagnosis: Laceration of right lower extremity, initial encounter [S81.811A]    HPI written by Dr. Wagner  5/4/24 \"Russ Diaz is a 44 y.o. male who initially presented at [facility] after falling in his lawn landing on an object stuck in the ground. He was cleaned and sutured at [place] and CTA was performed revealing no evidence of acute arterial injury. No contrast extravasation. + intramuscular hematoma of rectus femoris. He was transferred to [facility] as a a trauma level A. On examination, the right thigh wound was sutured with underlying hematoma. No skin changes. AROM of all extremities, palpable DP/PT bilaterally. Pain controlled. No additional injuries noted. \"    Procedures Performed: No orders of the defined types were placed in this encounter.      Summary of Hospital Course: Please see above and reference hospital medical records    Significant Findings, Care, Treatment and Services Provided: Due to short duration of hospitalization, please reference above for all significant findings and care.    Complications: None    Condition at Discharge: stable         Discharge instructions/Information to patient and family:   See after visit summary for information provided to patient and family.      Provisions for Follow-Up Care:  See after visit summary for information related to follow-up care and any pertinent home health orders.      PCP: No primary care provider on file.    Disposition: Home    Planned Readmission: No    Discharge Medications:  See after visit summary for reconciled discharge medications provided to patient and family.                   "

## 2024-10-24 ENCOUNTER — HOME CARE VISIT (OUTPATIENT)
Dept: HOME HEALTH SERVICES | Facility: HOME HEALTH | Age: 79
End: 2024-10-24
Payer: MEDICAID

## 2024-10-24 ENCOUNTER — TELEPHONE (OUTPATIENT)
Dept: INTERNAL MEDICINE | Facility: HOSPITAL | Age: 79
End: 2024-10-24
Payer: MEDICAID

## 2024-10-24 VITALS
SYSTOLIC BLOOD PRESSURE: 129 MMHG | TEMPERATURE: 98.4 F | DIASTOLIC BLOOD PRESSURE: 84 MMHG | HEART RATE: 100 BPM | RESPIRATION RATE: 13 BRPM

## 2024-10-24 PROCEDURE — G0151 HHCP-SERV OF PT,EA 15 MIN: HCPCS

## 2024-10-24 PROCEDURE — G0152 HHCP-SERV OF OT,EA 15 MIN: HCPCS

## 2024-10-24 SDOH — HEALTH STABILITY: PHYSICAL HEALTH
EXERCISE COMMENTS: PERFORMED 10 REPS EACH OF FOLLOWING: LAQ, STANDING HIP ABD, FLEX AND EXTENSION, STANDING HEEL RAISES, MINI SQUATS AND STANDING KNEE FLEXION.

## 2024-10-24 ASSESSMENT — ENCOUNTER SYMPTOMS
DENIES PAIN: 1
DEPRESSION: 0
LOSS OF SENSATION IN FEET: 0
PERSON REPORTING PAIN: PATIENT
OCCASIONAL FEELINGS OF UNSTEADINESS: 0

## 2024-10-24 ASSESSMENT — ACTIVITIES OF DAILY LIVING (ADL): AMBULATION ASSISTANCE ON FLAT SURFACES: 1

## 2024-10-24 NOTE — TELEPHONE ENCOUNTER
Called patient regarding his blood work. His Cr is slightly worse than earlier this year and his hgb is lower as well. He denies any bleeding, blood in stool/toilet, or black stools. We will monitor his kidney function and hgb at his next visit and also send iron studies. Patient understands and is agreeable.

## 2024-10-26 ASSESSMENT — ENCOUNTER SYMPTOMS
PERSON REPORTING PAIN: PATIENT
DENIES PAIN: 1

## 2024-10-28 ENCOUNTER — HOME CARE VISIT (OUTPATIENT)
Dept: HOME HEALTH SERVICES | Facility: HOME HEALTH | Age: 79
End: 2024-10-28
Payer: MEDICAID

## 2024-10-28 PROCEDURE — G0157 HHC PT ASSISTANT EA 15: HCPCS | Mod: CQ

## 2024-10-28 SDOH — HEALTH STABILITY: PHYSICAL HEALTH: EXERCISE TYPE: BLE STANDING

## 2024-10-28 SDOH — HEALTH STABILITY: PHYSICAL HEALTH: EXERCISE COMMENTS: PT COMPLETED BLE STANDING: 2 X10 REPS  MARCHES  HIP ABD  HIP EXT  HEEL RAISES

## 2024-10-28 ASSESSMENT — ENCOUNTER SYMPTOMS
PERSON REPORTING PAIN: PATIENT
PAIN LOCATION - PAIN QUALITY: ACHING
SUBJECTIVE PAIN PROGRESSION: WAXING AND WANING
PAIN: 1
PAIN LOCATION - PAIN SEVERITY: 4/10
PAIN LOCATION: GENERALIZED
LOWEST PAIN SEVERITY IN PAST 24 HOURS: 3/10
MUSCLE WEAKNESS: 1
HIGHEST PAIN SEVERITY IN PAST 24 HOURS: 5/10

## 2024-10-28 ASSESSMENT — ACTIVITIES OF DAILY LIVING (ADL)
AMBULATION_DISTANCE/DURATION_TOLERATED: 20 FT X2
AMBULATION ASSISTANCE ON FLAT SURFACES: 1
AMBULATION ASSISTANCE: STAND BY ASSIST
CURRENT_FUNCTION: STAND BY ASSIST

## 2024-10-28 NOTE — PROGRESS NOTES
"Mr. Ogden is an 78 yo man known to me from prior visits. Here for routine follow up for his chronic medical problems including:    HFrEF with EF 35-40%  OA, severe  AVB with pacemaker  CKD stage 3  Aortic root dilation    Still awaiting chair lift and toilet seat riser with arms. Asking for continuation of home health OT/PT. Complaints of ongoing left ankle pain but markedly better than it was at last visit; can walk now but with pain. Still using Tylenol 500 mg QID and baby ASA QID (we've encouraged him to take this only once daily). Due for labs today; not interested in flu vaccine today.    Exam today: NAD, on the phone seeking where to send the prescription for the durable medical equipment.  /76   Pulse 94   Temp 36.2 °C (97.2 °F) (Temporal)   Ht 1.803 m (5' 11\")   Wt 96.2 kg (212 lb)   SpO2 97%   BMI 29.57 kg/m²   Lungs clear but distant sounds  RR  Abd soft NT  Ext chronic joint changes both knees    A/P  Agree with labs today and refer for repeat ECHO to evaluate EF and aortic root. RTC in 3 months or sooner if needed. We will hopefully resulvd the issue of the DME at today's visit. Prescription has been written three times previously but patients still does not have it. Will ask care manager to intervene if necessary.    I saw and evaluated the patient. I personally obtained the key and critical portions of the history and physical exam or was physically present for key and critical portions performed by the resident/fellow. I reviewed the resident/fellow's documentation and discussed the patient with the resident/fellow. I agree with the resident/fellow's medical decision making as documented in the note.    Jazmin Glez MD      "

## 2024-10-30 ENCOUNTER — HOME CARE VISIT (OUTPATIENT)
Dept: HOME HEALTH SERVICES | Facility: HOME HEALTH | Age: 79
End: 2024-10-30
Payer: MEDICAID

## 2024-10-30 PROCEDURE — G0157 HHC PT ASSISTANT EA 15: HCPCS | Mod: CQ

## 2024-10-30 ASSESSMENT — ACTIVITIES OF DAILY LIVING (ADL)
CURRENT_FUNCTION: STAND BY ASSIST
AMBULATION ASSISTANCE: STAND BY ASSIST
AMBULATION_DISTANCE/DURATION_TOLERATED: 40 FT
AMBULATION ASSISTANCE ON FLAT SURFACES: 1

## 2024-10-30 ASSESSMENT — ENCOUNTER SYMPTOMS
PERSON REPORTING PAIN: PATIENT
PAIN LOCATION: RIGHT ANKLE
MUSCLE WEAKNESS: 1
PAIN LOCATION - PAIN SEVERITY: 3/10
PAIN LOCATION - PAIN QUALITY: ACHE
PAIN: 1

## 2024-11-01 ENCOUNTER — HOME CARE VISIT (OUTPATIENT)
Dept: HOME HEALTH SERVICES | Facility: HOME HEALTH | Age: 79
End: 2024-11-01
Payer: MEDICAID

## 2024-11-01 VITALS
TEMPERATURE: 98.1 F | RESPIRATION RATE: 13 BRPM | HEART RATE: 93 BPM | DIASTOLIC BLOOD PRESSURE: 75 MMHG | SYSTOLIC BLOOD PRESSURE: 118 MMHG

## 2024-11-01 PROCEDURE — G0151 HHCP-SERV OF PT,EA 15 MIN: HCPCS

## 2024-11-01 SDOH — ECONOMIC STABILITY: HOUSING INSECURITY: EVIDENCE OF SMOKING MATERIAL: 1

## 2024-11-01 SDOH — HEALTH STABILITY: PHYSICAL HEALTH
EXERCISE COMMENTS: PERFORMED 15 REPS EACH OF FOLLOWING: LAQ, STANDING HIP ABD, FLEX AND EXTENSION, STANDING HEEL RAISES, MINI SQUATS AND STANDING KNEE FLEXION.

## 2024-11-01 SDOH — HEALTH STABILITY: MENTAL HEALTH: SMOKING IN HOME: 1

## 2024-11-01 ASSESSMENT — ENCOUNTER SYMPTOMS
OCCASIONAL FEELINGS OF UNSTEADINESS: 0
DEPRESSION: 0
LOSS OF SENSATION IN FEET: 0

## 2024-11-01 ASSESSMENT — ACTIVITIES OF DAILY LIVING (ADL)
HOME_HEALTH_OASIS: 00
OASIS_M1830: 00

## 2025-02-17 DIAGNOSIS — I50.23 ACUTE ON CHRONIC SYSTOLIC HEART FAILURE: ICD-10-CM

## 2025-02-18 RX ORDER — LOSARTAN POTASSIUM 50 MG/1
50 TABLET ORAL DAILY
Qty: 30 TABLET | Refills: 11 | Status: SHIPPED | OUTPATIENT
Start: 2025-02-18

## 2025-03-10 ENCOUNTER — DOCUMENTATION (OUTPATIENT)
Facility: HOSPITAL | Age: 80
End: 2025-03-10
Payer: MEDICAID

## 2025-03-10 DIAGNOSIS — N40.0 BENIGN PROSTATIC HYPERPLASIA, UNSPECIFIED WHETHER LOWER URINARY TRACT SYMPTOMS PRESENT: ICD-10-CM

## 2025-03-10 DIAGNOSIS — M25.562 CHRONIC PAIN OF BOTH KNEES: ICD-10-CM

## 2025-03-10 DIAGNOSIS — M25.561 CHRONIC PAIN OF BOTH KNEES: ICD-10-CM

## 2025-03-10 DIAGNOSIS — G89.29 CHRONIC PAIN OF BOTH KNEES: ICD-10-CM

## 2025-03-10 DIAGNOSIS — I50.23 ACUTE ON CHRONIC SYSTOLIC HEART FAILURE: ICD-10-CM

## 2025-03-10 RX ORDER — TAMSULOSIN HYDROCHLORIDE 0.4 MG/1
0.4 CAPSULE ORAL DAILY
Qty: 30 CAPSULE | Refills: 0 | OUTPATIENT
Start: 2025-03-10

## 2025-03-10 NOTE — PROGRESS NOTES
Pt called requesting refill of tamsulosin and torsemide. Noted pt not seen in this clinic. Task message sent to pt provider and clinical support staff.

## 2025-03-11 RX ORDER — ACETAMINOPHEN 500 MG
500 TABLET ORAL EVERY 4 HOURS PRN
Qty: 30 TABLET | Refills: 11 | Status: SHIPPED | OUTPATIENT
Start: 2025-03-11

## 2025-03-11 RX ORDER — LOSARTAN POTASSIUM 50 MG/1
50 TABLET ORAL DAILY
Qty: 30 TABLET | Refills: 11 | Status: SHIPPED | OUTPATIENT
Start: 2025-03-11

## 2025-03-11 RX ORDER — TAMSULOSIN HYDROCHLORIDE 0.4 MG/1
0.4 CAPSULE ORAL DAILY
Qty: 30 CAPSULE | Refills: 11 | Status: SHIPPED | OUTPATIENT
Start: 2025-03-11 | End: 2026-03-11

## 2025-03-25 ENCOUNTER — OFFICE VISIT (OUTPATIENT)
Dept: PRIMARY CARE | Facility: HOSPITAL | Age: 80
End: 2025-03-25
Payer: MEDICAID

## 2025-03-25 VITALS
TEMPERATURE: 97.3 F | SYSTOLIC BLOOD PRESSURE: 102 MMHG | BODY MASS INDEX: 28.97 KG/M2 | DIASTOLIC BLOOD PRESSURE: 64 MMHG | HEIGHT: 71 IN | HEART RATE: 84 BPM | OXYGEN SATURATION: 97 % | WEIGHT: 206.9 LBS

## 2025-03-25 DIAGNOSIS — I50.20 HFREF (HEART FAILURE WITH REDUCED EJECTION FRACTION): ICD-10-CM

## 2025-03-25 DIAGNOSIS — D64.9 ANEMIA, UNSPECIFIED TYPE: Primary | ICD-10-CM

## 2025-03-25 DIAGNOSIS — N40.0 BENIGN PROSTATIC HYPERPLASIA, UNSPECIFIED WHETHER LOWER URINARY TRACT SYMPTOMS PRESENT: ICD-10-CM

## 2025-03-25 DIAGNOSIS — I25.10 CORONARY ARTERY DISEASE INVOLVING NATIVE CORONARY ARTERY OF NATIVE HEART WITHOUT ANGINA PECTORIS: ICD-10-CM

## 2025-03-25 DIAGNOSIS — I50.23 ACUTE ON CHRONIC SYSTOLIC HEART FAILURE: ICD-10-CM

## 2025-03-25 DIAGNOSIS — N18.9 CHRONIC KIDNEY DISEASE, UNSPECIFIED CKD STAGE: ICD-10-CM

## 2025-03-25 PROCEDURE — 1123F ACP DISCUSS/DSCN MKR DOCD: CPT

## 2025-03-25 PROCEDURE — 1159F MED LIST DOCD IN RCRD: CPT

## 2025-03-25 PROCEDURE — 99213 OFFICE O/P EST LOW 20 MIN: CPT

## 2025-03-25 PROCEDURE — 99213 OFFICE O/P EST LOW 20 MIN: CPT | Mod: GC,GE

## 2025-03-25 PROCEDURE — 1126F AMNT PAIN NOTED NONE PRSNT: CPT

## 2025-03-25 RX ORDER — TAMSULOSIN HYDROCHLORIDE 0.4 MG/1
0.4 CAPSULE ORAL DAILY
Qty: 30 CAPSULE | Refills: 11 | Status: SHIPPED | OUTPATIENT
Start: 2025-03-25 | End: 2026-03-25

## 2025-03-25 RX ORDER — NAPROXEN SODIUM 220 MG/1
81 TABLET, FILM COATED ORAL DAILY
Qty: 30 TABLET | Refills: 3 | Status: SHIPPED | OUTPATIENT
Start: 2025-03-25

## 2025-03-25 RX ORDER — ATORVASTATIN CALCIUM 40 MG/1
40 TABLET, FILM COATED ORAL NIGHTLY
Qty: 30 TABLET | Refills: 3 | Status: SHIPPED | OUTPATIENT
Start: 2025-03-25 | End: 2026-03-25

## 2025-03-25 RX ORDER — LOSARTAN POTASSIUM 50 MG/1
50 TABLET ORAL DAILY
Qty: 30 TABLET | Refills: 11 | Status: SHIPPED | OUTPATIENT
Start: 2025-03-25

## 2025-03-25 RX ORDER — CARVEDILOL 6.25 MG/1
6.25 TABLET ORAL
Qty: 60 TABLET | Refills: 11 | Status: SHIPPED | OUTPATIENT
Start: 2025-03-25 | End: 2026-03-25

## 2025-03-25 RX ORDER — TORSEMIDE 20 MG/1
40 TABLET ORAL DAILY PRN
Qty: 180 TABLET | Refills: 1 | Status: SHIPPED | OUTPATIENT
Start: 2025-03-25 | End: 2025-09-21

## 2025-03-25 ASSESSMENT — PATIENT HEALTH QUESTIONNAIRE - PHQ9
1. LITTLE INTEREST OR PLEASURE IN DOING THINGS: NOT AT ALL
SUM OF ALL RESPONSES TO PHQ9 QUESTIONS 1 AND 2: 0
2. FEELING DOWN, DEPRESSED OR HOPELESS: NOT AT ALL

## 2025-03-25 ASSESSMENT — ENCOUNTER SYMPTOMS
DEPRESSION: 0
LOSS OF SENSATION IN FEET: 0
OCCASIONAL FEELINGS OF UNSTEADINESS: 0

## 2025-03-25 ASSESSMENT — PAIN SCALES - GENERAL: PAINLEVEL_OUTOF10: 0-NO PAIN

## 2025-03-25 NOTE — PATIENT INSTRUCTIONS
Thank you for visiting us today. It was a pleasure seeing you. Here is a summary of what we discussed:     We refilled your medications, for your water pill (torsemide), please only taken it if your legs start to swell  2.    Please complete your echocardiogram,. A new order was placed for that.  3.    We obtained some blood work today, we will let you know about the result    Please return to clinic in 3 months.    Marvel Ken MD  [facility]    To schedule a specialty appointment, please call [phone].     If you have any questions or concerns, please contact the [facility] at [phone].   Fax: [phone]

## 2025-03-25 NOTE — PROGRESS NOTES
"Primary Care Internal Medicine Clinic Note     Subjective   Interval history:  -last seen 10/2024    Since, patient reports he has been feeling well and no longer needs oxygen, returned his tank. Does not have any SOB, [place], PND, dizziness or chest pain/palpitations. Has been compliant with his medications but unsure if taking torsemide (unlikely given order  in ).     Weight today 206lbs, which is probably around his dry weight given weight records.              Objective   Visit Vitals  /64 (BP Location: Left arm, Patient Position: Sitting, BP Cuff Size: Adult)   Pulse 84   Temp 36.3 °C (97.3 °F) (Temporal)   Ht 1.803 m (5' 11\")   Wt 93.8 kg (206 lb 14.4 oz)   SpO2 97%   BMI 28.86 kg/m²   Smoking Status Former   BSA 2.17 m²       Physical exam:  Gen: well-appearing, NAD  Neck: No JVD  CV: RRR no mrg  Pulm: CTAB, no wheezing or crackles, normal WOB on RA  Abd: soft, non-tender, non-distended  Ext: WWP, no LE edema  Neuro: alert and oriented x3, normal tone, face symmetric, moves all extremities spontaneously    Medications:  Current Outpatient Medications   Medication Instructions    acetaminophen (TYLENOL) 500 mg, oral, Every 4 hours PRN    aspirin 81 mg, oral, Daily    atorvastatin (LIPITOR) 40 mg, oral, Nightly    carvedilol (COREG) 6.25 mg, oral, 2 times daily (morning and late afternoon)    diclofenac sodium (VOLTAREN) 4 g, Topical, 4 times daily    losartan (COZAAR) 50 mg, oral, Daily    miscellaneous medical supply [facility] Lift chair at home    oxygen (O2) 2 L/min, inhalation, As needed    Spiriva Respimat 2.5 mcg/actuation inhaler 2 puffs, inhalation, Daily    tamsulosin (FLOMAX) 0.4 mg, oral, Daily    torsemide (DEMADEX) 40 mg, oral, Daily PRN    Ventolin HFA 90 mcg/actuation inhaler 2 puffs, inhalation, Every 4 hours PRN        Allergies:  Allergies   Allergen Reactions    Penicillins Anaphylaxis             Assessment/Plan    Bereket Ogden is a 80 y.o. male with PMHx of Stage C systolic " "HF/NICM/HFrEF (TTE 3/23 EF 25-30%), 2:1 AV block s/p ICD, Aflutter s/p CTI RFA (2018)HTN, nonobstructive CAD, ascending aortic dilatation (4.1 cm on CT 03/2023), h/o RLE DVT (setting of COVID; 12/2021), BPH, and CKD (baseline Scr ~1.5), previous respiratory failure requiring oxygen supplementation (setting of COVID PNA; 12/2022) presenting to [facility] for follow up.         #Stage C systolic HF/NICM/HFrEF (TTE 3/23 EF 25-30%)   #2:1 AV block s/p ICD  #Aflutter s/p CTI RFA (2018)  #HTN  ::Weight 206lbs, dry  -Instructed on dietary discretion in terms of salt and daily weight check   -Pt refused lab work today, will attempt to check RFP next visit   -Current GDMT regimen - Losartan 50, Coreg 6.125, torsemide 40 mg (has not been taking torsemide), refilled losartan and coureg, refilled torsemide as PRN, defer rest of GDMT to cardiology (has appointment next month), also TTE ordered     #CAD, nonobstructive   :: Cath 10/2020 w/ mild RCA stenosis 40-50%   ::LDL 71 10/2024  -c/w ASA 81 mg daily, atorvastatin 40 mg daily, beta blocker      #Ascending aortic dilatation (4.1 cm on CT 03/2023)  -due for surveillance screening, will opt for TTE since he is due for one either way for HFrEF     #BPH  -C/w tamsulosin 0.4      #CKD (baseline Scr ~1.5)  :: Likely in the setting of HTN. HA1C 4.8.   - Labs today    #Anemia   ::Hgb 13->11->9 over the past 3 years  ::last colonoscopy 2015 normal, now out of screening window  ::no evidence of bleed, no melena or hematochezia  -repeat CBC, obtain iron studies, folate, B12     #COPD  -Chart diagnosis of COPD, however PFTs from 10/2020 w/ no airway obstruction  - C/w albuterol prn, Spiriva 2 puffs daily     # Health maintenance  - Last HbA1c: 4.8%  - Vaccinations: Pt refused vaccines today \"I got them at a fair my [employer] did\"  - Colonoscopy: 2015 negative, repeat in 5 years however patient is 80      RTC in 3 months    Patient staffed with Dr. Jose Raul Mae MD  Internal Medicine " PGY-3  [facility]  P [phone]  F [phone]

## 2025-03-25 NOTE — PROGRESS NOTES
I reviewed the resident/fellow's documentation and discussed the patient with the resident/fellow. I agree with the resident/fellow's medical decision making as documented in the note.    ATTENDING TEMPLATE [facility]  NAME: Michelle  HPI: 80 year old here for followup without complaints.  He is unable to share his medication details.  He recalls his meds are 5 pills in AM and 2 in the PM.    PMX: HFrEf, atrial flutter, CKD, ascending aortic dilatation 4.1 cm, COVID pneumonia causing resp arrest,  HTN  MEDS: carvedilol, losartan, atorvastatin, aspirin, tamsulosin  ALLERGIES:   SOC HX:   FAM HX:    PE:  weight 206, 102/64, 84, hemoglobin = 9.5,  euvolemic  RECOMMEND:  Workup and repeat CBC  Clarify meds  Doing great today  Repeat renal profile  Renew torsemide to use PRN  Clarify support system   Dayna Blunt MD

## 2025-03-26 ENCOUNTER — TELEPHONE (OUTPATIENT)
Dept: INTERNAL MEDICINE | Facility: HOSPITAL | Age: 80
End: 2025-03-26
Payer: MEDICAID

## 2025-03-26 DIAGNOSIS — N17.9 AKI (ACUTE KIDNEY INJURY): Primary | ICD-10-CM

## 2025-03-26 LAB
ALBUMIN SERPL-MCNC: 4.3 G/DL (ref 3.6–5.1)
BASOPHILS # BLD AUTO: 39 CELLS/UL (ref 0–200)
BASOPHILS NFR BLD AUTO: 0.9 %
BUN SERPL-MCNC: 69 MG/DL (ref 7–25)
BUN/CREAT SERPL: 24 (CALC) (ref 6–22)
CALCIUM SERPL-MCNC: 9.1 MG/DL (ref 8.6–10.3)
CHLORIDE SERPL-SCNC: 102 MMOL/L (ref 98–110)
CO2 SERPL-SCNC: 24 MMOL/L (ref 20–32)
CREAT SERPL-MCNC: 2.84 MG/DL (ref 0.7–1.22)
EGFRCR SERPLBLD CKD-EPI 2021: 21 ML/MIN/1.73M2
EOSINOPHIL # BLD AUTO: 301 CELLS/UL (ref 15–500)
EOSINOPHIL NFR BLD AUTO: 7 %
ERYTHROCYTE [DISTWIDTH] IN BLOOD BY AUTOMATED COUNT: 12.3 % (ref 11–15)
FERRITIN SERPL-MCNC: 436 NG/ML (ref 24–380)
FOLATE SERPL-MCNC: 8.6 NG/ML
GLUCOSE SERPL-MCNC: 98 MG/DL (ref 65–99)
HCT VFR BLD AUTO: 36.4 % (ref 38.5–50)
HGB BLD-MCNC: 12 G/DL (ref 13.2–17.1)
IRON SATN MFR SERPL: 44 % (CALC) (ref 20–48)
IRON SERPL-MCNC: 143 MCG/DL (ref 50–180)
LYMPHOCYTES # BLD AUTO: 1140 CELLS/UL (ref 850–3900)
LYMPHOCYTES NFR BLD AUTO: 26.5 %
MCH RBC QN AUTO: 29.6 PG (ref 27–33)
MCHC RBC AUTO-ENTMCNC: 33 G/DL (ref 32–36)
MCV RBC AUTO: 89.9 FL (ref 80–100)
MONOCYTES # BLD AUTO: 473 CELLS/UL (ref 200–950)
MONOCYTES NFR BLD AUTO: 11 %
NEUTROPHILS # BLD AUTO: 2348 CELLS/UL (ref 1500–7800)
NEUTROPHILS NFR BLD AUTO: 54.6 %
PHOSPHATE SERPL-MCNC: 4.5 MG/DL (ref 2.1–4.3)
PLATELET # BLD AUTO: 153 THOUSAND/UL (ref 140–400)
PMV BLD REES-ECKER: 12.5 FL (ref 7.5–12.5)
POTASSIUM SERPL-SCNC: 4.5 MMOL/L (ref 3.5–5.3)
RBC # BLD AUTO: 4.05 MILLION/UL (ref 4.2–5.8)
SODIUM SERPL-SCNC: 137 MMOL/L (ref 135–146)
TIBC SERPL-MCNC: 324 MCG/DL (CALC) (ref 250–425)
VIT B12 SERPL-MCNC: 571 PG/ML (ref 200–1100)
WBC # BLD AUTO: 4.3 THOUSAND/UL (ref 3.8–10.8)

## 2025-03-26 NOTE — TELEPHONE ENCOUNTER
Patient has an MALENA on recent labs (creat 2.84 on 3/25/2025 from baseline of 1.6-1.8). Called patient concerning worsening kidney function.   Denies new complaints. Patient endorses taking Losartan and Torsemide (daily - not PRN). Asked patient to stop taking Losartan and Torsemide for 2 days and ordered repeat RFP on 3/28/2025.

## 2025-03-28 ENCOUNTER — TELEPHONE (OUTPATIENT)
Dept: INTERNAL MEDICINE | Facility: HOSPITAL | Age: 80
End: 2025-03-28
Payer: MEDICAID

## 2025-03-28 DIAGNOSIS — N17.9 AKI (ACUTE KIDNEY INJURY): ICD-10-CM

## 2025-03-28 NOTE — TELEPHONE ENCOUNTER
"Called patient to follow up on MALENA. I explained that he has worsening kidney function and that this is a significant problem requiring urgent follow up. Patient said that he \"doesn't think this is life threatening\" and that \"he feels completely normal at the moment\". I explained that although this is not currently life threatening however an MALENA is very concerning and can be dangerous especially in view of his PMHx. I explained that an MALENA does not always reflect in symptoms and that he can still be feeling well and having normal urine output despite a significant rise in creatinine. Patient said that he wants to come to the hospital on 04/18/2025 (he has an appointment at that time) and that he will get repeat labs on 04/18/2025. Patient said that \"in case he doesn't feel well he will call 911\". I explained that postponing assessment of his MALENA until 04/18 would be dangerous and could potentially contribute to further worsening of his kidney function. I recommneded that he obtains lab workup today or goes to the ED today.   "

## 2025-04-15 ENCOUNTER — APPOINTMENT (OUTPATIENT)
Dept: CARDIOLOGY | Facility: HOSPITAL | Age: 80
End: 2025-04-15
Payer: MEDICAID

## 2025-04-18 ENCOUNTER — HOSPITAL ENCOUNTER (OUTPATIENT)
Dept: CARDIOLOGY | Facility: HOSPITAL | Age: 80
Discharge: HOME | End: 2025-04-18
Payer: MEDICAID

## 2025-04-18 DIAGNOSIS — I42.0 DILATED CARDIOMYOPATHY (MULTI): ICD-10-CM

## 2025-04-18 DIAGNOSIS — Z95.810 PRESENCE OF AUTOMATIC (IMPLANTABLE) CARDIAC DEFIBRILLATOR: ICD-10-CM

## 2025-04-18 DIAGNOSIS — I50.20 HFREF (HEART FAILURE WITH REDUCED EJECTION FRACTION): ICD-10-CM

## 2025-04-18 PROCEDURE — 93284 PRGRMG EVAL IMPLANTABLE DFB: CPT

## 2025-04-19 LAB
ALBUMIN SERPL-MCNC: 4.3 G/DL (ref 3.6–5.1)
BUN SERPL-MCNC: 65 MG/DL (ref 7–25)
BUN/CREAT SERPL: 23 (CALC) (ref 6–22)
CALCIUM SERPL-MCNC: 8.5 MG/DL (ref 8.6–10.3)
CHLORIDE SERPL-SCNC: 103 MMOL/L (ref 98–110)
CO2 SERPL-SCNC: 19 MMOL/L (ref 20–32)
CREAT SERPL-MCNC: 2.81 MG/DL (ref 0.7–1.22)
EGFRCR SERPLBLD CKD-EPI 2021: 22 ML/MIN/1.73M2
GLUCOSE SERPL-MCNC: 83 MG/DL (ref 65–99)
PHOSPHATE SERPL-MCNC: 5.1 MG/DL (ref 2.1–4.3)
POTASSIUM SERPL-SCNC: 4.6 MMOL/L (ref 3.5–5.3)
SODIUM SERPL-SCNC: 136 MMOL/L (ref 135–146)

## 2025-04-22 DIAGNOSIS — N17.9 ACUTE KIDNEY INJURY SUPERIMPOSED ON CKD: Primary | ICD-10-CM

## 2025-04-22 DIAGNOSIS — N18.9 ACUTE KIDNEY INJURY SUPERIMPOSED ON CKD: Primary | ICD-10-CM

## 2025-04-22 NOTE — PROGRESS NOTES
Called patient regarding blood work showing Cr around 2.8 which is what is was about 4 weeks ago. Bicarb is also down at 19. Patient states he feels fine and denies any SOB, passing out, chest pain. He is making clear urine and has no flank pain. We will refer him to nephrology and plan to trend his RFP again next week, patient agreeable. If cr continues to worsen should consider holding ARB but given patient has HFrEF and this may just be CKD progression I am holding off for now. Patient instructed to go to ED if he has any SOB CP or syncope.

## 2025-04-28 ENCOUNTER — LAB (OUTPATIENT)
Dept: LAB | Facility: HOSPITAL | Age: 80
End: 2025-04-28
Payer: MEDICAID

## 2025-04-28 ENCOUNTER — OFFICE VISIT (OUTPATIENT)
Dept: PRIMARY CARE | Facility: HOSPITAL | Age: 80
End: 2025-04-28
Payer: MEDICAID

## 2025-04-28 VITALS
OXYGEN SATURATION: 97 % | BODY MASS INDEX: 30.07 KG/M2 | HEART RATE: 81 BPM | WEIGHT: 214.8 LBS | TEMPERATURE: 98.2 F | SYSTOLIC BLOOD PRESSURE: 130 MMHG | DIASTOLIC BLOOD PRESSURE: 77 MMHG | HEIGHT: 71 IN

## 2025-04-28 DIAGNOSIS — N17.9 AKI (ACUTE KIDNEY INJURY): Primary | ICD-10-CM

## 2025-04-28 DIAGNOSIS — N17.9 ACUTE KIDNEY FAILURE, UNSPECIFIED: Primary | ICD-10-CM

## 2025-04-28 PROCEDURE — 84155 ASSAY OF PROTEIN SERUM: CPT

## 2025-04-28 ASSESSMENT — PATIENT HEALTH QUESTIONNAIRE - PHQ9
SUM OF ALL RESPONSES TO PHQ9 QUESTIONS 1 AND 2: 0
1. LITTLE INTEREST OR PLEASURE IN DOING THINGS: NOT AT ALL
2. FEELING DOWN, DEPRESSED OR HOPELESS: NOT AT ALL

## 2025-04-28 ASSESSMENT — ENCOUNTER SYMPTOMS
OCCASIONAL FEELINGS OF UNSTEADINESS: 1
LOSS OF SENSATION IN FEET: 1
DEPRESSION: 0

## 2025-04-28 ASSESSMENT — PAIN SCALES - GENERAL: PAINLEVEL_OUTOF10: 6

## 2025-04-28 NOTE — PATIENT INSTRUCTIONS
As discussed today, our plan is:     Labs - we collected labs today and will call you with any abnormal results. If you have any questions or concerns prior to us calling feel free to call our office to have your questions addressed.   Medication changes: PLEASE ONLY TAKE TORSEMIDE 40 MG ON DAYS WHEN YOU ARE SWOLLEN. This is not a daily medication, this is an as needed medication.   Imaging: Please schedule your kidney ultrasound.   Consultations - you were referred to see the following specialists: Nephrology You should receive a call from central scheduling in the next few days if you do not receive a call within 3-5 business days please call [phone] to schedule your appointment.   4.   If you smoke or use other tobacco products, take steps to quit. Call [phone] for more information or to set up an appointment with  [facility]. The [place] Tobacco Quit Line is a free resource for people who don’t have insurance, receive Medicaid, pregnant women, or members of the [place] [facility]. Call 8-353-QUIT-NOW or 1-324.468.9353.    Please come back to see us in: 6 weeks.    ------  If you have any problems or questions, please contact the clinic at [phone] to leave a message. Our fax number is [phone]. If your issue cannot wait until the next business day, please go to urgent care or the emergency department.     I also strongly urge all of my patients to register for [email or web address] by going to: [email or web address]  (The  staff can also send you a text/email link to register when you check out).    No shows: It is understandable if you are unable to make it to a visit, but please cancel your appointment instead of not showing up. This helps to give other patients access to primary care and keeps wait times low.        [facility]   [phone]

## 2025-04-28 NOTE — PROGRESS NOTES
"Primary Care Clinic Note     Subjective     Bereket Ogden is an 81 yo male w/ pmhx of Stage C systolic HF/NICM/HFrEF (TTE 3/23 EF 25-30%), 2:1 AV block s/p ICD, Aflutter s/p CTI RFA (2018)HTN, nonobstructive CAD, ascending aortic dilatation (4.1 cm on CT 03/2023), h/o RLE DVT (setting of COVID; 12/2021), BPH, and CKD (baseline Scr ~1.5), previous respiratory failure requiring oxygen supplementation (setting of COVID PNA; 12/2022) who presents for follow-up regarding MALENA.     Interval history:  - [place] [facility] [place] for follow-up. At that time, patient had lab work ordered including RFP,  CBC w/ diff, Iron studies, B12, and folate. Notably had MALENA on labs. Patient informed and refused to proceed to ED or to re-present for labs/care. Advised to hold Losartan and Torsemide for 2 days.     This Visit:   - He reports feeling without any acute concerns. He has not had any changes in the frequency or amount of urine he is making. He has not had any increased urgency or nocturia. He denies any flank pain. When informed of his kidney injury, he denies having any issues or history of kidney injury (despite having a history of CKD). He is currently taking his as needed torsemide 40 mg twice daily. He endorses that he is willing to get his labs and imaging done. He will schedule an appointment with a nephrologist. He was informed of the risks regarding untreated kidney injuries including potential progression to dialysis. He does not want to be on dialysis and wants to follow through with the plan. Daughter on phone updated.          Objective   Visit Vitals  /77 (BP Location: Left arm, Patient Position: Sitting, BP Cuff Size: Adult)   Pulse 81   Temp 36.8 °C (98.2 °F) (Temporal)   Ht 1.803 m (5' 11\")   Wt 97.4 kg (214 lb 12.8 oz)   SpO2 97%   BMI 29.96 kg/m²   Smoking Status Former   BSA 2.21 m²     Physical exam:  Gen: well-appearing, NAD  Head and neck: NCAT, neck supple without LAD  HEENT: MMM, normal nose " without congestion, tympanic membranes visible and wnl bilaterally  CV: RRR, S1 and S2 noted and nml, no murmur, distal peripheral pulses palpable and equal throughout, absent peripheral edema  Pulm: Normal WOB, good air entry throughout all lobes, CTAB  Abd: soft, non-tender, non-distended  Neuro: alert and oriented x3, normal tone, face symmetric, moves all extremities spontaneously    Medications:  Current Outpatient Medications   Medication Instructions    acetaminophen (TYLENOL) 500 mg, oral, Every 4 hours PRN    aspirin 81 mg, oral, Daily    atorvastatin (LIPITOR) 40 mg, oral, Nightly    carvedilol (COREG) 6.25 mg, oral, 2 times daily (morning and late afternoon)    diclofenac sodium (VOLTAREN) 4 g, Topical, 4 times daily    losartan (COZAAR) 50 mg, oral, Daily    miscellaneous medical supply misc Lift chair at home    oxygen (O2) 2 L/min, inhalation, As needed    Spiriva Respimat 2.5 mcg/actuation inhaler 2 puffs, inhalation, Daily    tamsulosin (FLOMAX) 0.4 mg, oral, Daily    torsemide (DEMADEX) 40 mg, oral, Daily PRN    Ventolin HFA 90 mcg/actuation inhaler 2 puffs, inhalation, Every 4 hours PRN      Allergies:  RX Allergies[1]          Assessment/Plan      Bereket Ogden is an 81 yo male w/ pmhx of Stage C systolic HF/NICM/HFrEF (TTE 3/23 EF 25-30%), 2:1 AV block s/p ICD, Aflutter s/p CTI RFA (2018)HTN, nonobstructive CAD, ascending aortic dilatation (4.1 cm on CT 03/2023), h/o RLE DVT (setting of COVID; 12/2021), BPH, and CKD (baseline Scr ~1.5), previous respiratory failure requiring oxygen supplementation (setting of COVID PNA; 12/2022) who presents for follow-up regarding MALENA.     His kidney injury initially found to have Cr elevation to 2.8 has been stable on labs. Notably, he has been taking his as needed torsemide 40 mg twice daily erroneously. He thought that was the frequency he needed to take the medication. This is likely contributing to his kidney injury. He was informed of the correct  dosing, was provided with paperwork for this, and his daughter was updated on the phone as well. He will have labs drawn as detailed below. Additionally, will work-up MALENA w/ complete renal ultrasound. Referral to nephrology placed given his MALENA on CKD. Detailed plan below:     #MALENA on CKD (baseline Scr ~1.5)  :: Likely pre-renal  :: HA1C 4.8.   :: Cr elevated to ~2.8  - Reduce torsemide dosing to the appropriate 40 mg as needed for swelling  - RFP, Mg, UA with reflex to culture and micro, Urine Albumin:Cr ratio, SPEP, UPEP.   - Nephrology referral placed  - Follow-up in 6 weeks    #Stage C systolic HF/NICM/HFrEF (TTE 3/23 EF 25-30%)   #2:1 AV block s/p ICD  #Aflutter s/p CTI RFA (2018)  #HTN  ::Weight 214 lbs  :: appears euvolemic on exam   - Missed last cardiology appointment (04/17/2025)  - TTE ordered   - Reviewed dietary discretion in terms of salt and daily weight check   -Current GDMT regimen - Losartan 50, Coreg 6.25,  , refilled losartan and coreg  - Torsemide 40 mg daily as needed for swelling  - TTE order placed last visit, needs to schedule    RTC in 6 weeks  Plan discussed with Dr. Adrianna Duffy MD  Internal Medicine/Pediatrics PGY-1         [1]   Allergies  Allergen Reactions    Penicillins Anaphylaxis

## 2025-04-29 LAB
ALBUMIN SERPL-MCNC: 4.4 G/DL (ref 3.6–5.1)
ALBUMIN: 4 G/DL (ref 3.4–5)
ALPHA 1 GLOBULIN: 0.4 G/DL (ref 0.2–0.6)
ALPHA 2 GLOBULIN: 0.9 G/DL (ref 0.4–1.1)
APPEARANCE UR: CLEAR
BACTERIA #/AREA URNS HPF: ABNORMAL /HPF
BACTERIA UR CULT: ABNORMAL
BETA GLOBULIN: 0.9 G/DL (ref 0.5–1.2)
BILIRUB UR QL STRIP: NEGATIVE
BUN SERPL-MCNC: 42 MG/DL (ref 7–25)
BUN/CREAT SERPL: 23 (CALC) (ref 6–22)
CALCIUM SERPL-MCNC: 9 MG/DL (ref 8.6–10.3)
CHLORIDE SERPL-SCNC: 108 MMOL/L (ref 98–110)
CO2 SERPL-SCNC: 22 MMOL/L (ref 20–32)
COLOR UR: YELLOW
CREAT SERPL-MCNC: 1.85 MG/DL (ref 0.7–1.22)
EGFRCR SERPLBLD CKD-EPI 2021: 36 ML/MIN/1.73M2
GAMMA GLOBULIN: 1.7 G/DL (ref 0.5–1.4)
GLUCOSE SERPL-MCNC: 102 MG/DL (ref 65–139)
GLUCOSE UR QL STRIP: NEGATIVE
HGB UR QL STRIP: NEGATIVE
HYALINE CASTS #/AREA URNS LPF: ABNORMAL /LPF
KETONES UR QL STRIP: NEGATIVE
LEUKOCYTE ESTERASE UR QL STRIP: ABNORMAL
MAGNESIUM SERPL-MCNC: 2.2 MG/DL (ref 1.5–2.5)
NITRITE UR QL STRIP: NEGATIVE
PATH REVIEW-SERUM PROTEIN ELECTROPHORESIS: ABNORMAL
PH UR STRIP: 6 [PH] (ref 5–8)
PHOSPHATE SERPL-MCNC: 2.7 MG/DL (ref 2.1–4.3)
POTASSIUM SERPL-SCNC: 4.4 MMOL/L (ref 3.5–5.3)
PROT SERPL-MCNC: 7.7 G/DL (ref 6.4–8.2)
PROT UR QL STRIP: ABNORMAL
PROTEIN ELECTROPHORESIS COMMENT: ABNORMAL
RBC #/AREA URNS HPF: ABNORMAL /HPF
SERVICE CMNT-IMP: ABNORMAL
SODIUM SERPL-SCNC: 141 MMOL/L (ref 135–146)
SP GR UR STRIP: 1.02 (ref 1–1.03)
SQUAMOUS #/AREA URNS HPF: ABNORMAL /HPF
WBC #/AREA URNS HPF: ABNORMAL /HPF

## 2025-05-02 ENCOUNTER — TELEPHONE (OUTPATIENT)
Dept: PEDIATRIC HEMATOLOGY/ONCOLOGY | Facility: HOSPITAL | Age: 80
End: 2025-05-02
Payer: MEDICAID

## 2025-05-02 NOTE — TELEPHONE ENCOUNTER
TC placed to patient to update on lab results. His renal function is improving. Encouraged to schedule nephrology appointment and renal ultrasound. He has some lateral thigh pain responsive to tylenol. He will call to make an acute visit with the clinic if it is still concerning him or no longer responsive to tylenol. Otherwise, he continues to feel well and is getting around at his baseline functional status.     Jared Duffy MD  Internal Medicine/Pediatrics PGY-1

## 2025-05-04 NOTE — PROGRESS NOTES
I reviewed the resident/fellow's documentation and discussed the patient with the resident/fellow. I agree with the resident/fellow's medical decision making as documented in the note.     Keyana Rodas MD MPH

## 2025-06-25 LAB
ALBUMIN SERPL-MCNC: 4.3 G/DL (ref 3.6–5.1)
BUN SERPL-MCNC: 23 MG/DL (ref 7–25)
BUN/CREAT SERPL: 11 (CALC) (ref 6–22)
CALCIUM SERPL-MCNC: 8.9 MG/DL (ref 8.6–10.3)
CHLORIDE SERPL-SCNC: 107 MMOL/L (ref 98–110)
CO2 SERPL-SCNC: 20 MMOL/L (ref 20–32)
CREAT SERPL-MCNC: 2.06 MG/DL (ref 0.7–1.22)
EGFRCR SERPLBLD CKD-EPI 2021: 32 ML/MIN/1.73M2
GLUCOSE SERPL-MCNC: 106 MG/DL (ref 65–99)
PHOSPHATE SERPL-MCNC: 3.7 MG/DL (ref 2.1–4.3)
POTASSIUM SERPL-SCNC: 5.2 MMOL/L (ref 3.5–5.3)
SODIUM SERPL-SCNC: 138 MMOL/L (ref 135–146)

## 2025-06-27 ENCOUNTER — TELEPHONE (OUTPATIENT)
Dept: PRIMARY CARE | Facility: HOSPITAL | Age: 80
End: 2025-06-27
Payer: MEDICAID

## 2025-06-27 NOTE — TELEPHONE ENCOUNTER
Left message for patient to call office to schedule follow-up visit.  Patient was due for a 6wk FUV on 6/9/25

## 2025-07-01 ENCOUNTER — APPOINTMENT (OUTPATIENT)
Dept: NEPHROLOGY | Facility: CLINIC | Age: 80
End: 2025-07-01
Payer: MEDICAID

## 2025-07-08 ENCOUNTER — APPOINTMENT (OUTPATIENT)
Dept: NEPHROLOGY | Facility: CLINIC | Age: 80
End: 2025-07-08
Payer: MEDICAID

## 2025-08-28 ENCOUNTER — APPOINTMENT (OUTPATIENT)
Dept: NEPHROLOGY | Facility: CLINIC | Age: 80
End: 2025-08-28
Payer: MEDICAID

## 2025-08-28 VITALS
DIASTOLIC BLOOD PRESSURE: 84 MMHG | BODY MASS INDEX: 29.4 KG/M2 | HEIGHT: 71 IN | SYSTOLIC BLOOD PRESSURE: 121 MMHG | HEART RATE: 94 BPM | WEIGHT: 210 LBS

## 2025-08-28 DIAGNOSIS — I10 ESSENTIAL HYPERTENSION: ICD-10-CM

## 2025-08-28 DIAGNOSIS — N18.32 STAGE 3B CHRONIC KIDNEY DISEASE (MULTI): Primary | ICD-10-CM

## 2025-08-28 PROCEDURE — 3074F SYST BP LT 130 MM HG: CPT | Performed by: INTERNAL MEDICINE

## 2025-08-28 PROCEDURE — 1159F MED LIST DOCD IN RCRD: CPT | Performed by: INTERNAL MEDICINE

## 2025-08-28 PROCEDURE — 99203 OFFICE O/P NEW LOW 30 MIN: CPT | Performed by: INTERNAL MEDICINE

## 2025-08-28 PROCEDURE — 3079F DIAST BP 80-89 MM HG: CPT | Performed by: INTERNAL MEDICINE

## 2025-09-26 ENCOUNTER — APPOINTMENT (OUTPATIENT)
Dept: NEPHROLOGY | Facility: CLINIC | Age: 80
End: 2025-09-26
Payer: MEDICAID